# Patient Record
Sex: MALE | Race: WHITE | Employment: FULL TIME | URBAN - NONMETROPOLITAN AREA
[De-identification: names, ages, dates, MRNs, and addresses within clinical notes are randomized per-mention and may not be internally consistent; named-entity substitution may affect disease eponyms.]

---

## 2019-11-12 ENCOUNTER — HOSPITAL ENCOUNTER (OUTPATIENT)
Age: 29
Setting detail: OBSERVATION
Discharge: HOME | End: 2019-11-13
Attending: FAMILY MEDICINE | Admitting: INTERNAL MEDICINE
Payer: COMMERCIAL

## 2019-11-12 ENCOUNTER — APPOINTMENT (OUTPATIENT)
Dept: MRI IMAGING | Age: 29
End: 2019-11-12
Payer: COMMERCIAL

## 2019-11-12 DIAGNOSIS — M54.9 INTRACTABLE BACK PAIN: Primary | ICD-10-CM

## 2019-11-12 DIAGNOSIS — M54.41 ACUTE RIGHT-SIDED LOW BACK PAIN WITH RIGHT-SIDED SCIATICA: ICD-10-CM

## 2019-11-12 PROCEDURE — 96374 THER/PROPH/DIAG INJ IV PUSH: CPT

## 2019-11-12 PROCEDURE — 2580000003 HC RX 258: Performed by: INTERNAL MEDICINE

## 2019-11-12 PROCEDURE — 96372 THER/PROPH/DIAG INJ SC/IM: CPT

## 2019-11-12 PROCEDURE — 94761 N-INVAS EAR/PLS OXIMETRY MLT: CPT

## 2019-11-12 PROCEDURE — 96375 TX/PRO/DX INJ NEW DRUG ADDON: CPT

## 2019-11-12 PROCEDURE — 96376 TX/PRO/DX INJ SAME DRUG ADON: CPT

## 2019-11-12 PROCEDURE — G0378 HOSPITAL OBSERVATION PER HR: HCPCS

## 2019-11-12 PROCEDURE — 6360000002 HC RX W HCPCS: Performed by: FAMILY MEDICINE

## 2019-11-12 PROCEDURE — 6360000002 HC RX W HCPCS: Performed by: INTERNAL MEDICINE

## 2019-11-12 PROCEDURE — 6370000000 HC RX 637 (ALT 250 FOR IP): Performed by: INTERNAL MEDICINE

## 2019-11-12 PROCEDURE — 72148 MRI LUMBAR SPINE W/O DYE: CPT

## 2019-11-12 PROCEDURE — 99284 EMERGENCY DEPT VISIT MOD MDM: CPT

## 2019-11-12 RX ORDER — ONDANSETRON 2 MG/ML
4 INJECTION INTRAMUSCULAR; INTRAVENOUS ONCE
Status: COMPLETED | OUTPATIENT
Start: 2019-11-12 | End: 2019-11-12

## 2019-11-12 RX ORDER — ONDANSETRON 2 MG/ML
4 INJECTION INTRAMUSCULAR; INTRAVENOUS EVERY 6 HOURS PRN
Status: DISCONTINUED | OUTPATIENT
Start: 2019-11-12 | End: 2019-11-13 | Stop reason: HOSPADM

## 2019-11-12 RX ORDER — GABAPENTIN 300 MG/1
300 CAPSULE ORAL 3 TIMES DAILY
Status: DISCONTINUED | OUTPATIENT
Start: 2019-11-12 | End: 2019-11-13 | Stop reason: HOSPADM

## 2019-11-12 RX ORDER — LIDOCAINE 4 G/G
1 PATCH TOPICAL DAILY
Status: DISCONTINUED | OUTPATIENT
Start: 2019-11-12 | End: 2019-11-13 | Stop reason: HOSPADM

## 2019-11-12 RX ORDER — IBUPROFEN 800 MG/1
800 TABLET ORAL EVERY 8 HOURS PRN
Qty: 30 TABLET | Refills: 1 | Status: SHIPPED | OUTPATIENT
Start: 2019-11-12 | End: 2019-11-13 | Stop reason: HOSPADM

## 2019-11-12 RX ORDER — METHYLPREDNISOLONE SODIUM SUCCINATE 125 MG/2ML
80 INJECTION, POWDER, LYOPHILIZED, FOR SOLUTION INTRAMUSCULAR; INTRAVENOUS EVERY 8 HOURS
Status: DISCONTINUED | OUTPATIENT
Start: 2019-11-12 | End: 2019-11-13 | Stop reason: HOSPADM

## 2019-11-12 RX ORDER — CYCLOBENZAPRINE HCL 10 MG
10 TABLET ORAL NIGHTLY PRN
Qty: 15 TABLET | Refills: 0 | Status: SHIPPED | OUTPATIENT
Start: 2019-11-12 | End: 2019-11-13 | Stop reason: HOSPADM

## 2019-11-12 RX ORDER — DEXAMETHASONE SODIUM PHOSPHATE 10 MG/ML
10 INJECTION INTRAMUSCULAR; INTRAVENOUS ONCE
Status: COMPLETED | OUTPATIENT
Start: 2019-11-12 | End: 2019-11-12

## 2019-11-12 RX ORDER — HYDROCODONE BITARTRATE AND ACETAMINOPHEN 5; 325 MG/1; MG/1
1 TABLET ORAL EVERY 6 HOURS PRN
Qty: 10 TABLET | Refills: 0 | Status: SHIPPED | OUTPATIENT
Start: 2019-11-12 | End: 2019-11-13 | Stop reason: HOSPADM

## 2019-11-12 RX ORDER — SODIUM CHLORIDE 0.9 % (FLUSH) 0.9 %
10 SYRINGE (ML) INJECTION PRN
Status: DISCONTINUED | OUTPATIENT
Start: 2019-11-12 | End: 2019-11-13 | Stop reason: HOSPADM

## 2019-11-12 RX ORDER — PREDNISONE 20 MG/1
60 TABLET ORAL DAILY
Qty: 12 TABLET | Refills: 0 | Status: SHIPPED | OUTPATIENT
Start: 2019-11-12 | End: 2019-11-13 | Stop reason: HOSPADM

## 2019-11-12 RX ORDER — KETOROLAC TROMETHAMINE 30 MG/ML
30 INJECTION, SOLUTION INTRAMUSCULAR; INTRAVENOUS ONCE
Status: COMPLETED | OUTPATIENT
Start: 2019-11-12 | End: 2019-11-12

## 2019-11-12 RX ORDER — ORPHENADRINE CITRATE 30 MG/ML
30 INJECTION INTRAMUSCULAR; INTRAVENOUS ONCE
Status: COMPLETED | OUTPATIENT
Start: 2019-11-12 | End: 2019-11-12

## 2019-11-12 RX ORDER — SODIUM CHLORIDE 0.9 % (FLUSH) 0.9 %
10 SYRINGE (ML) INJECTION EVERY 12 HOURS SCHEDULED
Status: DISCONTINUED | OUTPATIENT
Start: 2019-11-12 | End: 2019-11-13 | Stop reason: HOSPADM

## 2019-11-12 RX ORDER — MORPHINE SULFATE 4 MG/ML
4 INJECTION, SOLUTION INTRAMUSCULAR; INTRAVENOUS ONCE
Status: COMPLETED | OUTPATIENT
Start: 2019-11-12 | End: 2019-11-12

## 2019-11-12 RX ORDER — ACETAMINOPHEN 325 MG/1
650 TABLET ORAL EVERY 4 HOURS PRN
Status: DISCONTINUED | OUTPATIENT
Start: 2019-11-12 | End: 2019-11-13 | Stop reason: HOSPADM

## 2019-11-12 RX ADMIN — METHYLPREDNISOLONE SODIUM SUCCINATE 80 MG: 125 INJECTION, POWDER, FOR SOLUTION INTRAMUSCULAR; INTRAVENOUS at 16:35

## 2019-11-12 RX ADMIN — GABAPENTIN 300 MG: 300 CAPSULE ORAL at 20:45

## 2019-11-12 RX ADMIN — Medication 10 ML: at 16:36

## 2019-11-12 RX ADMIN — MORPHINE SULFATE 4 MG: 4 INJECTION INTRAVENOUS at 09:37

## 2019-11-12 RX ADMIN — HYDROMORPHONE HYDROCHLORIDE 1 MG: 1 INJECTION, SOLUTION INTRAMUSCULAR; INTRAVENOUS; SUBCUTANEOUS at 14:17

## 2019-11-12 RX ADMIN — DEXAMETHASONE SODIUM PHOSPHATE 10 MG: 10 INJECTION, SOLUTION INTRAMUSCULAR; INTRAVENOUS at 09:10

## 2019-11-12 RX ADMIN — Medication 10 ML: at 20:45

## 2019-11-12 RX ADMIN — ORPHENADRINE CITRATE 30 MG: 30 INJECTION INTRAMUSCULAR; INTRAVENOUS at 09:07

## 2019-11-12 RX ADMIN — ENOXAPARIN SODIUM 40 MG: 40 INJECTION SUBCUTANEOUS at 16:36

## 2019-11-12 RX ADMIN — GABAPENTIN 300 MG: 300 CAPSULE ORAL at 16:35

## 2019-11-12 RX ADMIN — HYDROMORPHONE HYDROCHLORIDE 1 MG: 1 INJECTION, SOLUTION INTRAMUSCULAR; INTRAVENOUS; SUBCUTANEOUS at 10:21

## 2019-11-12 RX ADMIN — ONDANSETRON 4 MG: 2 INJECTION, SOLUTION INTRAMUSCULAR; INTRAVENOUS at 09:37

## 2019-11-12 RX ADMIN — METHYLPREDNISOLONE SODIUM SUCCINATE 80 MG: 125 INJECTION, POWDER, FOR SOLUTION INTRAMUSCULAR; INTRAVENOUS at 22:22

## 2019-11-12 RX ADMIN — KETOROLAC TROMETHAMINE 30 MG: 30 INJECTION, SOLUTION INTRAMUSCULAR at 09:13

## 2019-11-12 ASSESSMENT — PAIN DESCRIPTION - PAIN TYPE
TYPE: ACUTE PAIN

## 2019-11-12 ASSESSMENT — PAIN DESCRIPTION - ONSET
ONSET: SUDDEN
ONSET: SUDDEN

## 2019-11-12 ASSESSMENT — PAIN SCALES - GENERAL
PAINLEVEL_OUTOF10: 8
PAINLEVEL_OUTOF10: 8
PAINLEVEL_OUTOF10: 7
PAINLEVEL_OUTOF10: 10
PAINLEVEL_OUTOF10: 10
PAINLEVEL_OUTOF10: 1
PAINLEVEL_OUTOF10: 10
PAINLEVEL_OUTOF10: 2

## 2019-11-12 ASSESSMENT — PAIN DESCRIPTION - ORIENTATION
ORIENTATION: RIGHT
ORIENTATION: LOWER
ORIENTATION: LEFT;LOWER;MID
ORIENTATION: RIGHT;LOWER

## 2019-11-12 ASSESSMENT — PAIN DESCRIPTION - LOCATION
LOCATION: BACK

## 2019-11-12 ASSESSMENT — PAIN DESCRIPTION - PROGRESSION: CLINICAL_PROGRESSION: GRADUALLY IMPROVING

## 2019-11-12 ASSESSMENT — PAIN DESCRIPTION - FREQUENCY
FREQUENCY: CONTINUOUS
FREQUENCY: CONTINUOUS

## 2019-11-12 ASSESSMENT — PAIN DESCRIPTION - DESCRIPTORS
DESCRIPTORS: SHARP
DESCRIPTORS: BURNING;RADIATING
DESCRIPTORS: SHARP

## 2019-11-12 ASSESSMENT — ENCOUNTER SYMPTOMS: BACK PAIN: 1

## 2019-11-13 VITALS
RESPIRATION RATE: 18 BRPM | TEMPERATURE: 97.7 F | WEIGHT: 309.3 LBS | HEART RATE: 89 BPM | OXYGEN SATURATION: 92 % | HEIGHT: 71 IN | SYSTOLIC BLOOD PRESSURE: 104 MMHG | BODY MASS INDEX: 43.3 KG/M2 | DIASTOLIC BLOOD PRESSURE: 43 MMHG

## 2019-11-13 PROCEDURE — 6370000000 HC RX 637 (ALT 250 FOR IP): Performed by: INTERNAL MEDICINE

## 2019-11-13 PROCEDURE — 6360000002 HC RX W HCPCS: Performed by: INTERNAL MEDICINE

## 2019-11-13 PROCEDURE — G0378 HOSPITAL OBSERVATION PER HR: HCPCS

## 2019-11-13 PROCEDURE — 2580000003 HC RX 258: Performed by: INTERNAL MEDICINE

## 2019-11-13 PROCEDURE — 94761 N-INVAS EAR/PLS OXIMETRY MLT: CPT

## 2019-11-13 PROCEDURE — 97161 PT EVAL LOW COMPLEX 20 MIN: CPT

## 2019-11-13 PROCEDURE — 96376 TX/PRO/DX INJ SAME DRUG ADON: CPT

## 2019-11-13 PROCEDURE — 96372 THER/PROPH/DIAG INJ SC/IM: CPT

## 2019-11-13 RX ORDER — TRAMADOL HYDROCHLORIDE 50 MG/1
50 TABLET ORAL EVERY 4 HOURS PRN
Status: DISCONTINUED | OUTPATIENT
Start: 2019-11-13 | End: 2019-11-13 | Stop reason: HOSPADM

## 2019-11-13 RX ORDER — PREDNISONE 20 MG/1
TABLET ORAL
Qty: 15 TABLET | Refills: 0 | Status: SHIPPED | OUTPATIENT
Start: 2019-11-13 | End: 2019-11-23

## 2019-11-13 RX ORDER — TRAMADOL HYDROCHLORIDE 50 MG/1
50 TABLET ORAL EVERY 4 HOURS PRN
Qty: 40 TABLET | Refills: 0 | Status: SHIPPED | OUTPATIENT
Start: 2019-11-13 | End: 2019-11-20

## 2019-11-13 RX ORDER — GABAPENTIN 300 MG/1
300 CAPSULE ORAL 3 TIMES DAILY
Qty: 30 CAPSULE | Refills: 0 | Status: SHIPPED | OUTPATIENT
Start: 2019-11-13 | End: 2021-05-20 | Stop reason: ALTCHOICE

## 2019-11-13 RX ORDER — LIDOCAINE 4 G/G
1 PATCH TOPICAL DAILY
Qty: 10 PATCH | Refills: 1 | Status: SHIPPED | OUTPATIENT
Start: 2019-11-13 | End: 2021-05-20

## 2019-11-13 RX ADMIN — GABAPENTIN 300 MG: 300 CAPSULE ORAL at 08:10

## 2019-11-13 RX ADMIN — METHYLPREDNISOLONE SODIUM SUCCINATE 80 MG: 125 INJECTION, POWDER, FOR SOLUTION INTRAMUSCULAR; INTRAVENOUS at 06:38

## 2019-11-13 RX ADMIN — ENOXAPARIN SODIUM 40 MG: 40 INJECTION SUBCUTANEOUS at 08:10

## 2019-11-13 RX ADMIN — TRAMADOL HYDROCHLORIDE 50 MG: 50 TABLET, FILM COATED ORAL at 08:10

## 2019-11-13 RX ADMIN — Medication 10 ML: at 08:10

## 2019-11-13 ASSESSMENT — PAIN DESCRIPTION - PAIN TYPE
TYPE: ACUTE PAIN
TYPE: ACUTE PAIN

## 2019-11-13 ASSESSMENT — PAIN SCALES - GENERAL
PAINLEVEL_OUTOF10: 1
PAINLEVEL_OUTOF10: 1
PAINLEVEL_OUTOF10: 9
PAINLEVEL_OUTOF10: 1
PAINLEVEL_OUTOF10: 0

## 2019-11-13 ASSESSMENT — PAIN DESCRIPTION - LOCATION
LOCATION: BACK
LOCATION: BACK

## 2019-11-13 ASSESSMENT — PAIN DESCRIPTION - DESCRIPTORS
DESCRIPTORS: SHARP
DESCRIPTORS: DISCOMFORT

## 2019-11-13 ASSESSMENT — PAIN DESCRIPTION - ORIENTATION
ORIENTATION: LOWER
ORIENTATION: LOWER

## 2019-11-18 ENCOUNTER — OFFICE VISIT (OUTPATIENT)
Dept: FAMILY MEDICINE CLINIC | Age: 29
End: 2019-11-18
Payer: COMMERCIAL

## 2019-11-18 VITALS
TEMPERATURE: 97.5 F | DIASTOLIC BLOOD PRESSURE: 64 MMHG | BODY MASS INDEX: 41.72 KG/M2 | SYSTOLIC BLOOD PRESSURE: 122 MMHG | WEIGHT: 308 LBS | OXYGEN SATURATION: 96 % | HEART RATE: 98 BPM | HEIGHT: 72 IN

## 2019-11-18 DIAGNOSIS — Z09 FOLLOW-UP EXAM: ICD-10-CM

## 2019-11-18 DIAGNOSIS — M54.9 INTRACTABLE BACK PAIN: Primary | ICD-10-CM

## 2019-11-18 PROCEDURE — 99203 OFFICE O/P NEW LOW 30 MIN: CPT | Performed by: NURSE PRACTITIONER

## 2019-11-18 RX ORDER — TIZANIDINE 4 MG/1
4 TABLET ORAL NIGHTLY PRN
Qty: 30 TABLET | Refills: 0 | Status: SHIPPED | OUTPATIENT
Start: 2019-11-18 | End: 2021-05-20

## 2019-11-18 ASSESSMENT — PATIENT HEALTH QUESTIONNAIRE - PHQ9
SUM OF ALL RESPONSES TO PHQ9 QUESTIONS 1 & 2: 0
1. LITTLE INTEREST OR PLEASURE IN DOING THINGS: 0
SUM OF ALL RESPONSES TO PHQ QUESTIONS 1-9: 0
SUM OF ALL RESPONSES TO PHQ QUESTIONS 1-9: 0
2. FEELING DOWN, DEPRESSED OR HOPELESS: 0

## 2019-11-18 ASSESSMENT — ENCOUNTER SYMPTOMS
COUGH: 0
DIARRHEA: 0
BOWEL INCONTINENCE: 0
CONSTIPATION: 0
BLOOD IN STOOL: 0
VOMITING: 0
NAUSEA: 0
ABDOMINAL PAIN: 0
BACK PAIN: 1
SHORTNESS OF BREATH: 0
SORE THROAT: 0

## 2019-11-19 ENCOUNTER — HOSPITAL ENCOUNTER (OUTPATIENT)
Dept: PHYSICAL THERAPY | Age: 29
Setting detail: THERAPIES SERIES
Discharge: HOME | End: 2019-11-19
Payer: COMMERCIAL

## 2019-11-19 PROCEDURE — 97110 THERAPEUTIC EXERCISES: CPT

## 2019-11-19 PROCEDURE — 97161 PT EVAL LOW COMPLEX 20 MIN: CPT

## 2019-11-19 ASSESSMENT — PAIN SCALES - GENERAL: PAINLEVEL_OUTOF10: 0

## 2019-11-21 ENCOUNTER — HOSPITAL ENCOUNTER (OUTPATIENT)
Dept: PHYSICAL THERAPY | Age: 29
Setting detail: THERAPIES SERIES
Discharge: HOME | End: 2019-11-21
Payer: COMMERCIAL

## 2019-11-21 PROCEDURE — 97140 MANUAL THERAPY 1/> REGIONS: CPT

## 2019-11-21 PROCEDURE — 97110 THERAPEUTIC EXERCISES: CPT

## 2021-05-20 ENCOUNTER — OFFICE VISIT (OUTPATIENT)
Dept: FAMILY MEDICINE CLINIC | Age: 31
End: 2021-05-20
Payer: COMMERCIAL

## 2021-05-20 VITALS
TEMPERATURE: 98.4 F | OXYGEN SATURATION: 97 % | HEART RATE: 94 BPM | BODY MASS INDEX: 44.1 KG/M2 | HEIGHT: 71 IN | WEIGHT: 315 LBS | DIASTOLIC BLOOD PRESSURE: 74 MMHG | SYSTOLIC BLOOD PRESSURE: 128 MMHG

## 2021-05-20 DIAGNOSIS — G47.33 OSA (OBSTRUCTIVE SLEEP APNEA): Primary | ICD-10-CM

## 2021-05-20 PROCEDURE — 99213 OFFICE O/P EST LOW 20 MIN: CPT | Performed by: NURSE PRACTITIONER

## 2021-05-20 SDOH — ECONOMIC STABILITY: FOOD INSECURITY: WITHIN THE PAST 12 MONTHS, THE FOOD YOU BOUGHT JUST DIDN'T LAST AND YOU DIDN'T HAVE MONEY TO GET MORE.: NEVER TRUE

## 2021-05-20 ASSESSMENT — ENCOUNTER SYMPTOMS
SHORTNESS OF BREATH: 0
VOMITING: 0
NAUSEA: 0
COUGH: 0
DIARRHEA: 0

## 2021-05-20 ASSESSMENT — PATIENT HEALTH QUESTIONNAIRE - PHQ9: SUM OF ALL RESPONSES TO PHQ9 QUESTIONS 1 & 2: 0

## 2021-05-20 NOTE — PROGRESS NOTES
HPI Notes    Name: Armond Medrano  : 1990         Chief Complaint:     Chief Complaint   Patient presents with    Sleep Apnea     Patient here today with loud snoring, sleep apnea. He is concerned because he just got engaged and wants to take care of this. History of Present Illness:        HPI  Pt is a 36 yo male who presents for concerns about sleep apnea. Pt snores loudly and has been witnessed to gasp for air while sleeping. First noticed several months ago. Past Medical History:     Past Medical History:   Diagnosis Date    Back pain       Reviewed all health maintenance requirements and ordered appropriate tests  Health Maintenance Due   Topic Date Due    Hepatitis C screen  Never done    Varicella vaccine (1 of 2 - 2-dose childhood series) Never done    COVID-19 Vaccine (1) Never done    HIV screen  Never done    DTaP/Tdap/Td vaccine (1 - Tdap) Never done       Past Surgical History:     Past Surgical History:   Procedure Laterality Date    CLEFT PALATE REPAIR          Medications:       Prior to Admission medications    Not on File        Allergies:       Patient has no known allergies. Social History:     Tobacco:    reports that he has never smoked. He has never used smokeless tobacco.  Alcohol:      reports previous alcohol use. Drug Use:  reports no history of drug use. Family History:      No family history on file. Review of Systems:         Review of Systems   Constitutional: Negative for chills and fever. Respiratory: Negative for cough and shortness of breath. Cardiovascular: Negative for chest pain and palpitations. Gastrointestinal: Negative for diarrhea, nausea and vomiting. Neurological: Negative for dizziness, seizures and headaches.          Physical Exam:     Vitals:  /74   Pulse 94   Temp 98.4 °F (36.9 °C) (Oral)   Ht 5' 11\" (1.803 m)   Wt (!) 346 lb (156.9 kg)   SpO2 97%   BMI 48.26 kg/m²       Physical Exam  Vitals and nursing note reviewed. Constitutional:       Appearance: He is well-developed. Cardiovascular:      Rate and Rhythm: Normal rate and regular rhythm. Heart sounds: S1 normal and S2 normal.   Pulmonary:      Effort: Pulmonary effort is normal. No respiratory distress. Breath sounds: Normal breath sounds. Abdominal:      General: Bowel sounds are normal.      Palpations: Abdomen is soft. Tenderness: There is no abdominal tenderness. Skin:     General: Skin is warm and dry. Psychiatric:         Behavior: Behavior is cooperative. Data:     No results found for: NA, K, CL, CO2, BUN, CREATININE, GLUCOSE, PROT, LABALBU, BILITOT, ALKPHOS, AST, ALT  No results found for: WBC, RBC, HGB, HCT, MCV, MCH, MCHC, RDW, PLT, MPV  No results found for: TSH  No results found for: CHOL, HDL, PSA, LABA1C       Assessment & Plan        Diagnosis Orders   1. LIBBY (obstructive sleep apnea)       Pt wants to do an in-home sleep study if possible. Pt will call his insurance and find out if in-lab vs home sleep study is covered. Patient verbalizes understanding and agreement with plan. All questions answered. If symptoms do not resolve or worsen, return to office. Completed Refills   Requested Prescriptions      No prescriptions requested or ordered in this encounter     No follow-ups on file. No orders of the defined types were placed in this encounter. No orders of the defined types were placed in this encounter. Patient Instructions   SURVEY:    You may be receiving a survey from [employer] regarding your visit today. Please complete the survey to enable us to provide the highest quality of care to you and your family. If you cannot score us a very good (5 Stars) on any question, please call the office to discuss how we could have made your experience a very good one. Thank you.     Clinical Care Team: MELODY Lin-YASSINE Jaffe LPN Clerical Team: [facility] 11        Electronically signed by MELODY Schaeffer CNP on 5/20/2021 at 5:30 PM           Completed Refills      Requested Prescriptions      No prescriptions requested or ordered in this encounter         Patricio Pearce received counseling on the following healthy behaviors: medication adherence  Reviewed prior labs and health maintenance. Continue current medications, diet and exercise. Discussed use, benefit, and side effects of prescribed medications. Barriers to medication compliance addressed. Patient given educational materials - see patient instructions. All patient questions answered. Patient voiced understanding.

## 2021-06-07 ENCOUNTER — TELEPHONE (OUTPATIENT)
Dept: FAMILY MEDICINE CLINIC | Age: 31
End: 2021-06-07

## 2021-06-07 DIAGNOSIS — G47.33 OSA (OBSTRUCTIVE SLEEP APNEA): Primary | ICD-10-CM

## 2021-06-07 NOTE — TELEPHONE ENCOUNTER
Patient's insurance would not give him a direct answer - go ahead with in lab referral and if they refuse that we will try home study    Health Maintenance   Topic Date Due    Hepatitis C screen  Never done    Varicella vaccine (1 of 2 - 2-dose childhood series) Never done    COVID-19 Vaccine (1) Never done    HIV screen  Never done    DTaP/Tdap/Td vaccine (1 - Tdap) Never done    Flu vaccine (Season Ended) 09/01/2021    Hepatitis A vaccine  Aged Out    Hepatitis B vaccine  Aged Out    Hib vaccine  Aged Out    Meningococcal (ACWY) vaccine  Aged Out    Pneumococcal 0-64 years Vaccine  Aged Out             (applicable per patient's age: Cancer Screenings, Depression Screening, Fall Risk Screening, Immunizations)    No results found for: LABA1C, LABMICR, LDLCHOLESTEROL, LDLCALC, AST, ALT, BUN   (goal A1C is < 7)   (goal LDL is <100) need 30-50% reduction from baseline     BP Readings from Last 3 Encounters:   05/20/21 128/74   11/18/19 122/64   11/13/19 (!) 104/43    (goal /80)      All Future Testing planned in [facility]:      Next Visit Date:  No future appointments.          Patient Active Problem List:     Intractable back pain

## 2021-06-25 ENCOUNTER — OFFICE VISIT (OUTPATIENT)
Dept: FAMILY MEDICINE CLINIC | Age: 31
End: 2021-06-25
Payer: COMMERCIAL

## 2021-06-25 VITALS
WEIGHT: 315 LBS | BODY MASS INDEX: 48.82 KG/M2 | OXYGEN SATURATION: 96 % | TEMPERATURE: 98.2 F | DIASTOLIC BLOOD PRESSURE: 78 MMHG | SYSTOLIC BLOOD PRESSURE: 124 MMHG | HEART RATE: 90 BPM

## 2021-06-25 DIAGNOSIS — D22.4 MULTIPLE BENIGN NEVI OF SCALP: Primary | ICD-10-CM

## 2021-06-25 PROCEDURE — 99213 OFFICE O/P EST LOW 20 MIN: CPT | Performed by: NURSE PRACTITIONER

## 2021-06-25 ASSESSMENT — ENCOUNTER SYMPTOMS
VOMITING: 0
SHORTNESS OF BREATH: 0
DIARRHEA: 0
COUGH: 0
NAUSEA: 0

## 2021-06-25 NOTE — PROGRESS NOTES
HPI Notes    Name: Ian Liang  : 1990         Chief Complaint:     Chief Complaint   Patient presents with    Lesion(s)     Patient here today to have areas checked on scalp. History of Present Illness:        HPI  Pt is a 36 yo male who present for 4 lesions to scalp. First noticed a couple years ago, but new lesion have formed over the past couple months. Lesions are not painful. Denies any drainage from them. Denies fever or chills. Past Medical History:     Past Medical History:   Diagnosis Date    Back pain       Reviewed all health maintenance requirements and ordered appropriate tests  Health Maintenance Due   Topic Date Due    Hepatitis C screen  Never done    Varicella vaccine (1 of 2 - 2-dose childhood series) Never done    COVID-19 Vaccine (1) Never done    HIV screen  Never done    DTaP/Tdap/Td vaccine (1 - Tdap) Never done       Past Surgical History:     Past Surgical History:   Procedure Laterality Date    CLEFT PALATE REPAIR          Medications:       Prior to Admission medications    Not on File        Allergies:       Patient has no known allergies. Social History:     Tobacco:    reports that he has never smoked. He has never used smokeless tobacco.  Alcohol:      reports previous alcohol use. Drug Use:  reports no history of drug use. Family History:      No family history on file. Review of Systems:         Review of Systems   Constitutional: Negative for chills and fever. Respiratory: Negative for cough and shortness of breath. Cardiovascular: Negative for chest pain and palpitations. Gastrointestinal: Negative for diarrhea, nausea and vomiting. Skin: Positive for rash (lesions to scalp). Neurological: Negative for dizziness, seizures and headaches.          Physical Exam:     Vitals:  /78   Pulse 90   Temp 98.2 °F (36.8 °C) (Oral)   Wt (!) 350 lb (158.8 kg)   SpO2 96%   BMI 48.82 kg/m²       Physical Exam  Vitals and nursing note reviewed. Constitutional:       Appearance: He is well-developed. HENT:      Head: Normocephalic. Comments: 4 small rounded lesion with clearly defined borders to scalp. Cardiovascular:      Rate and Rhythm: Normal rate and regular rhythm. Heart sounds: S1 normal and S2 normal.   Pulmonary:      Effort: Pulmonary effort is normal. No respiratory distress. Breath sounds: Normal breath sounds. Abdominal:      General: Bowel sounds are normal.      Palpations: Abdomen is soft. Tenderness: There is no abdominal tenderness. Skin:     General: Skin is warm and dry. Psychiatric:         Behavior: Behavior is cooperative. Data:     No results found for: NA, K, CL, CO2, BUN, CREATININE, GLUCOSE, PROT, LABALBU, BILITOT, ALKPHOS, AST, ALT  No results found for: WBC, RBC, HGB, HCT, MCV, MCH, MCHC, RDW, PLT, MPV  No results found for: TSH  No results found for: CHOL, HDL, PSA, LABA1C       Assessment & Plan        Diagnosis Orders   1. Multiple benign nevi of scalp       Pt given reassurance about benign nature of findings. Pt encouraged to stop picking at areas. Patient verbalizes understanding and agreement with plan. All questions answered. If symptoms do not resolve or worsen, return to office. Completed Refills   Requested Prescriptions      No prescriptions requested or ordered in this encounter     No follow-ups on file. No orders of the defined types were placed in this encounter. No orders of the defined types were placed in this encounter. Patient Instructions   SURVEY:    You may be receiving a survey from [employer] regarding your visit today. Please complete the survey to enable us to provide the highest quality of care to you and your family. If you cannot score us a very good (5 Stars) on any question, please call the office to discuss how we could have made your experience a very good one. Thank you.     Clinical Care Team: VALORIE Ewing LPN    Clerical Team: Aisha 11        Electronically signed by MELODY Ewing CNP on 6/25/2021 at 3:31 PM           Completed Refills      Requested Prescriptions      No prescriptions requested or ordered in this encounter         Juani Law received counseling on the following healthy behaviors: medication adherence  Reviewed prior labs and health maintenance. Continue current medications, diet and exercise. Discussed use, benefit, and side effects of prescribed medications. Barriers to medication compliance addressed. Patient given educational materials - see patient instructions. All patient questions answered. Patient voiced understanding.

## 2021-06-29 ENCOUNTER — HOSPITAL ENCOUNTER (OUTPATIENT)
Dept: SLEEP CENTER | Age: 31
Discharge: HOME | End: 2021-06-29
Payer: COMMERCIAL

## 2021-06-29 DIAGNOSIS — G47.33 OSA (OBSTRUCTIVE SLEEP APNEA): ICD-10-CM

## 2021-06-29 PROCEDURE — 95806 SLEEP STUDY UNATT&RESP EFFT: CPT

## 2021-06-29 ASSESSMENT — SLEEP AND FATIGUE QUESTIONNAIRES
HOW LIKELY ARE YOU TO NOD OFF OR FALL ASLEEP WHILE SITTING AND READING: 2
NECK CIRCUMFERENCE (INCHES): 20
HOW LIKELY ARE YOU TO NOD OFF OR FALL ASLEEP WHILE SITTING AND TALKING TO SOMEONE: 0
HOW LIKELY ARE YOU TO NOD OFF OR FALL ASLEEP WHEN YOU ARE A PASSENGER IN A CAR FOR AN HOUR WITHOUT A BREAK: 0
HOW LIKELY ARE YOU TO NOD OFF OR FALL ASLEEP WHILE LYING DOWN TO REST IN THE AFTERNOON WHEN CIRCUMSTANCES PERMIT: 1
HOW LIKELY ARE YOU TO NOD OFF OR FALL ASLEEP WHILE SITTING INACTIVE IN A PUBLIC PLACE: 1
HOW LIKELY ARE YOU TO NOD OFF OR FALL ASLEEP WHILE SITTING QUIETLY AFTER LUNCH WITHOUT ALCOHOL: 3
HOW LIKELY ARE YOU TO NOD OFF OR FALL ASLEEP WHILE WATCHING TV: 0
ESS TOTAL SCORE: 9
HOW LIKELY ARE YOU TO NOD OFF OR FALL ASLEEP IN A CAR, WHILE STOPPED FOR A FEW MINUTES IN TRAFFIC: 2

## 2021-07-02 ENCOUNTER — TELEPHONE (OUTPATIENT)
Dept: FAMILY MEDICINE CLINIC | Age: 31
End: 2021-07-02

## 2021-07-02 DIAGNOSIS — G47.33 OSA (OBSTRUCTIVE SLEEP APNEA): Primary | ICD-10-CM

## 2021-07-02 NOTE — TELEPHONE ENCOUNTER
----- Message from MELODY Blunt CNP sent at 7/2/2021  3:57 PM EDT -----  Rusty Perdomo,    Please order a titration study for this pt. Sridhar  ----- Message -----  From: Cynthia Ribeiro DO  Sent: 7/2/2021   1:27 PM EDT  To: MELODY Blunt CNP    Accidentally sent to me.   Dr. Yary Dickey  ----- Message -----  From: Aníbal Chaves Sleep Study Results  Sent: 7/1/2021   2:37 PM EDT  To: Cynthia Ribeiro DO

## 2021-08-02 LAB — STATUS: NORMAL

## 2021-08-26 ENCOUNTER — OFFICE VISIT (OUTPATIENT)
Dept: FAMILY MEDICINE CLINIC | Age: 31
End: 2021-08-26
Payer: COMMERCIAL

## 2021-08-26 VITALS
OXYGEN SATURATION: 97 % | DIASTOLIC BLOOD PRESSURE: 80 MMHG | HEART RATE: 76 BPM | SYSTOLIC BLOOD PRESSURE: 112 MMHG | TEMPERATURE: 98.1 F | WEIGHT: 315 LBS | BODY MASS INDEX: 49.51 KG/M2

## 2021-08-26 DIAGNOSIS — G47.33 OSA (OBSTRUCTIVE SLEEP APNEA): Primary | ICD-10-CM

## 2021-08-26 PROCEDURE — 99213 OFFICE O/P EST LOW 20 MIN: CPT | Performed by: NURSE PRACTITIONER

## 2021-08-26 ASSESSMENT — ENCOUNTER SYMPTOMS
VOMITING: 0
SHORTNESS OF BREATH: 0
NAUSEA: 0
COUGH: 0
DIARRHEA: 0

## 2021-08-26 NOTE — PATIENT INSTRUCTIONS
SURVEY:    You may be receiving a survey from [employer] regarding your visit today. Please complete the survey to enable us to provide the highest quality of care to you and your family. If you cannot score us a very good (5 Stars) on any question, please call the office to discuss how we could have made your experience a very good one. Thank you.     Clinical Care Team: MELODY Neri-YASSINE Valdez LPN    Clerical Team: Ana Wick

## 2021-08-26 NOTE — PROGRESS NOTES
HPI Notes    Name: Author Reason  : 1990         Chief Complaint:     Chief Complaint   Patient presents with    Sleep Apnea     Patient here today to discuss how he feels after starting on cpap. Started 2 weeks ago. History of Present Illness:        HPI  Pt is a 36 yo male who presents for follow up after starting a CPAP machine. Pt has been using the CPAP nightly and benefiting from it. Pt has a nasal mask and sometimes his mouth falls open while sleeping. Pt would like to get a full face mask. Past Medical History:     Past Medical History:   Diagnosis Date    Back pain       Reviewed all health maintenance requirements and ordered appropriate tests  Health Maintenance Due   Topic Date Due    Hepatitis C screen  Never done    Varicella vaccine (1 of 2 - 2-dose childhood series) Never done    COVID-19 Vaccine (1) Never done    HIV screen  Never done    DTaP/Tdap/Td vaccine (1 - Tdap) Never done       Past Surgical History:     Past Surgical History:   Procedure Laterality Date    CLEFT PALATE REPAIR          Medications:       Prior to Admission medications    Medication Sig Start Date End Date Taking? Authorizing Provider   CPAP Machine MISC by Does not apply route   Yes Historical Provider, MD        Allergies:       Patient has no known allergies. Social History:     Tobacco:    reports that he has never smoked. He has never used smokeless tobacco.  Alcohol:      reports previous alcohol use. Drug Use:  reports no history of drug use. Family History:      No family history on file. Review of Systems:         Review of Systems   Constitutional: Negative for chills and fever. Respiratory: Negative for cough and shortness of breath. Cardiovascular: Negative for chest pain and palpitations. Gastrointestinal: Negative for diarrhea, nausea and vomiting. Neurological: Negative for dizziness, seizures and headaches.          Physical Exam:     Vitals:  /80   Pulse 76 Temp 98.1 °F (36.7 °C) (Oral)   Wt (!) 355 lb (161 kg)   SpO2 97%   BMI 49.51 kg/m²       Physical Exam  Vitals and nursing note reviewed. Constitutional:       Appearance: He is well-developed. Cardiovascular:      Rate and Rhythm: Normal rate and regular rhythm. Heart sounds: S1 normal and S2 normal.   Pulmonary:      Effort: Pulmonary effort is normal. No respiratory distress. Breath sounds: Normal breath sounds. Abdominal:      General: Bowel sounds are normal.      Palpations: Abdomen is soft. Tenderness: There is no abdominal tenderness. Skin:     General: Skin is warm and dry. Psychiatric:         Behavior: Behavior is cooperative. Data:     No results found for: NA, K, CL, CO2, BUN, CREATININE, GLUCOSE, PROT, LABALBU, BILITOT, ALKPHOS, AST, ALT  No results found for: WBC, RBC, HGB, HCT, MCV, MCH, MCHC, RDW, PLT, MPV  No results found for: TSH  No results found for: CHOL, HDL, PSA, LABA1C       Assessment & Plan        Diagnosis Orders   1. LIBBY (obstructive sleep apnea)       Pt doing well on CPAP. Pt advised to call CPAP supplier and get changed over to a full face mask. Continue using CPAP with sterile water in the humidifier. Completed Refills   Requested Prescriptions      No prescriptions requested or ordered in this encounter     No follow-ups on file. No orders of the defined types were placed in this encounter. No orders of the defined types were placed in this encounter. Patient Instructions   SURVEY:    You may be receiving a survey from [employer] regarding your visit today. Please complete the survey to enable us to provide the highest quality of care to you and your family. If you cannot score us a very good (5 Stars) on any question, please call the office to discuss how we could have made your experience a very good one. Thank you.     Clinical Care Team: Markos Norris, MELODY-YASSINE Mary Walsh LPN    Clerical Team: [place] [facility]        Electronically signed by MELODY Bills CNP on 8/26/2021 at 3:46 PM           Completed Refills      Requested Prescriptions      No prescriptions requested or ordered in this encounter         Tracy Rivero received counseling on the following healthy behaviors: medication adherence  Reviewed prior labs and health maintenance. Continue current medications, diet and exercise. Discussed use, benefit, and side effects of prescribed medications. Barriers to medication compliance addressed. Patient given educational materials - see patient instructions. All patient questions answered. Patient voiced understanding.

## 2022-05-16 ENCOUNTER — TELEPHONE (OUTPATIENT)
Dept: FAMILY MEDICINE CLINIC | Age: 32
End: 2022-05-16

## 2022-05-16 NOTE — TELEPHONE ENCOUNTER
Pt called stating he is having heart palpations x one week, pt denies SOB, left are pain. Offered pt an AM appointment for tomorrow,pt denied stated he will go to the ER.

## 2022-05-19 ENCOUNTER — TELEPHONE (OUTPATIENT)
Dept: FAMILY MEDICINE CLINIC | Age: 32
End: 2022-05-19

## 2022-05-19 ENCOUNTER — HOSPITAL ENCOUNTER (OUTPATIENT)
Age: 32
Discharge: HOME | End: 2022-05-19
Payer: COMMERCIAL

## 2022-05-19 ENCOUNTER — OFFICE VISIT (OUTPATIENT)
Dept: FAMILY MEDICINE CLINIC | Age: 32
End: 2022-05-19
Payer: COMMERCIAL

## 2022-05-19 VITALS
DIASTOLIC BLOOD PRESSURE: 84 MMHG | WEIGHT: 315 LBS | HEART RATE: 84 BPM | SYSTOLIC BLOOD PRESSURE: 122 MMHG | TEMPERATURE: 97.6 F | OXYGEN SATURATION: 97 % | BODY MASS INDEX: 51.74 KG/M2

## 2022-05-19 DIAGNOSIS — I51.7 VENTRICULAR HYPERTROPHY: ICD-10-CM

## 2022-05-19 DIAGNOSIS — I49.3 FREQUENT PVCS: ICD-10-CM

## 2022-05-19 DIAGNOSIS — I49.3 FREQUENT PVCS: Primary | ICD-10-CM

## 2022-05-19 DIAGNOSIS — G47.33 OSA (OBSTRUCTIVE SLEEP APNEA): ICD-10-CM

## 2022-05-19 LAB
EKG ATRIAL RATE: 85 BPM
EKG P AXIS: 46 DEGREES
EKG P-R INTERVAL: 168 MS
EKG Q-T INTERVAL: 376 MS
EKG QRS DURATION: 92 MS
EKG QTC CALCULATION (BAZETT): 447 MS
EKG R AXIS: 64 DEGREES
EKG T AXIS: 36 DEGREES
EKG VENTRICULAR RATE: 85 BPM

## 2022-05-19 PROCEDURE — 93005 ELECTROCARDIOGRAM TRACING: CPT

## 2022-05-19 PROCEDURE — 93010 ELECTROCARDIOGRAM REPORT: CPT | Performed by: INTERNAL MEDICINE

## 2022-05-19 PROCEDURE — 99213 OFFICE O/P EST LOW 20 MIN: CPT | Performed by: NURSE PRACTITIONER

## 2022-05-19 ASSESSMENT — ENCOUNTER SYMPTOMS
VOMITING: 0
DIARRHEA: 0
COUGH: 0
SHORTNESS OF BREATH: 0
NAUSEA: 0

## 2022-05-19 ASSESSMENT — PATIENT HEALTH QUESTIONNAIRE - PHQ9
SUM OF ALL RESPONSES TO PHQ QUESTIONS 1-9: 0
SUM OF ALL RESPONSES TO PHQ QUESTIONS 1-9: 0
2. FEELING DOWN, DEPRESSED OR HOPELESS: 0
SUM OF ALL RESPONSES TO PHQ QUESTIONS 1-9: 0
SUM OF ALL RESPONSES TO PHQ9 QUESTIONS 1 & 2: 0
SUM OF ALL RESPONSES TO PHQ QUESTIONS 1-9: 0
1. LITTLE INTEREST OR PLEASURE IN DOING THINGS: 0

## 2022-05-19 NOTE — TELEPHONE ENCOUNTER
----- Message from MELODY Wallace CNP sent at 5/19/2022 12:24 PM EDT -----  Please call pt and tell him I spoke with Dr Hope Andrade. He said to do a 48 hour holter monitor along with the EKG and echo.

## 2022-05-19 NOTE — PROGRESS NOTES
HPI Notes    Name: Anastacio Troy  : 1990         Chief Complaint:     Chief Complaint   Patient presents with    Palpitations     Patient here today for ED followup. Was at [facility] ED on 22 and dx with heart palpitations, PVC's and dehydration. History of Present Illness:        HPI  Patient is a 52-year-old male who reports for ED follow-up. Patient was seen at [facility] for complaints of palpitations. EKG showed frequent PVCs. Pt has been drinking monster energy drinks every morning. Past Medical History:     Past Medical History:   Diagnosis Date    Back pain       Reviewed all health maintenance requirements and ordered appropriate tests  Health Maintenance Due   Topic Date Due    Varicella vaccine (1 of 2 - 2-dose childhood series) Never done    COVID-19 Vaccine (1) Never done    HIV screen  Never done    Hepatitis C screen  Never done    DTaP/Tdap/Td vaccine (1 - Tdap) Never done       Past Surgical History:     Past Surgical History:   Procedure Laterality Date    CLEFT PALATE REPAIR          Medications:       Prior to Admission medications    Medication Sig Start Date End Date Taking? Authorizing Provider   CPAP Machine MISC by Does not apply route   Yes Historical Provider, MD        Allergies:       Patient has no known allergies. Social History:     Tobacco:    reports that he has never smoked. He has never used smokeless tobacco.  Alcohol:      reports previous alcohol use. Drug Use:  reports no history of drug use. Family History:     No family history on file. Review of Systems:         Review of Systems   Constitutional: Negative for chills and fever. Respiratory: Negative for cough and shortness of breath. Cardiovascular: Positive for palpitations. Negative for chest pain. Gastrointestinal: Negative for diarrhea, nausea and vomiting. Neurological: Negative for dizziness, seizures and headaches.          Physical Exam:     Vitals:  /84 Pulse 84   Temp 97.6 °F (36.4 °C) (Oral)   Wt (!) 371 lb (168.3 kg)   SpO2 97%   BMI 51.74 kg/m²       Physical Exam  Vitals and nursing note reviewed. Constitutional:       Appearance: He is well-developed. Cardiovascular:      Rate and Rhythm: Normal rate and regular rhythm. Heart sounds: S1 normal and S2 normal.   Pulmonary:      Effort: Pulmonary effort is normal. No respiratory distress. Breath sounds: Normal breath sounds. Abdominal:      General: Bowel sounds are normal.      Palpations: Abdomen is soft. Tenderness: There is no abdominal tenderness. Skin:     General: Skin is warm and dry. Psychiatric:         Behavior: Behavior is cooperative. Data:     No results found for: NA, K, CL, CO2, BUN, CREATININE, GLUCOSE, PROT, LABALBU, BILITOT, ALKPHOS, AST, ALT  No results found for: WBC, RBC, HGB, HCT, MCV, MCH, MCHC, RDW, PLT, MPV  No results found for: TSH  No results found for: CHOL, LDL, HDL, PSA, LABA1C       Assessment & Plan        Diagnosis Orders   1. Frequent PVCs  ECHO 2D WO Color Doppler Complete    EKG 12 Lead   2. Ventricular hypertrophy  ECHO 2D WO Color Doppler Complete    EKG 12 Lead   3. LIBBY (obstructive sleep apnea)       Will send for repeat EKG and cardiac echo. Pt educated to stop drinking energy drinks and decrease caffeine in diet. Encouraged to use CPAP every night. Completed Refills   Requested Prescriptions      No prescriptions requested or ordered in this encounter     No follow-ups on file. No orders of the defined types were placed in this encounter.     Orders Placed This Encounter   Procedures    EKG 12 Lead     Standing Status:   Future     Number of Occurrences:   1     Standing Expiration Date:   5/19/2023     Order Specific Question:   Reason for Exam?     Answer:   Irregular heart rate    ECHO 2D WO Color Doppler Complete     Standing Status:   Future     Standing Expiration Date:   5/19/2023     Order Specific Question:   Reason for exam:     Answer:   Frequent PVCs, LIBBY somewhat treated         Patient Instructions   SURVEY:    You may be receiving a survey from [employer] regarding your visit today. Please complete the survey to enable us to provide the highest quality of care to you and your family. If you cannot score us a very good (5 Stars) on any question, please call the office to discuss how we could have made your experience a very good one. Thank you.     Clinical Care Team: VALORIE Gonzalez LPN    Clerical Team: Lenka Dunn        Electronically signed by MELODY Gonzalez CNP on 5/19/2022 at 12:15 PM           Completed Refills   Requested Prescriptions      No prescriptions requested or ordered in this encounter

## 2022-05-25 ENCOUNTER — HOSPITAL ENCOUNTER (OUTPATIENT)
Dept: NON INVASIVE DIAGNOSTICS | Age: 32
Discharge: HOME | End: 2022-05-25
Payer: COMMERCIAL

## 2022-05-25 DIAGNOSIS — I49.3 FREQUENT PVCS: ICD-10-CM

## 2022-05-25 DIAGNOSIS — I51.7 VENTRICULAR HYPERTROPHY: ICD-10-CM

## 2022-05-25 LAB
LV EF: 60 %
LVEF MODALITY: NORMAL

## 2022-05-25 PROCEDURE — 93226 XTRNL ECG REC<48 HR SCAN A/R: CPT

## 2022-05-25 PROCEDURE — 93225 XTRNL ECG REC<48 HRS REC: CPT

## 2022-05-25 PROCEDURE — 93306 TTE W/DOPPLER COMPLETE: CPT

## 2022-05-29 LAB
ACQUISITION DURATION: NORMAL S
AVERAGE HEART RATE: 85 BPM
EKG DIAGNOSIS: NORMAL
HOLTER MAX HEART RATE: 131 BPM
HOOKUP DATE: NORMAL
HOOKUP TIME: NORMAL
MAX HEART RATE TIME/DATE: NORMAL
MIN HEART RATE TIME/DATE: NORMAL
MIN HEART RATE: 57 BPM
NUMBER OF QRS COMPLEXES: NORMAL
NUMBER OF SUPRAVENTRICULAR COUPLETS: 1
NUMBER OF SUPRAVENTRICULAR ECTOPICS: NORMAL
NUMBER OF SUPRAVENTRICULAR ISOLATED BEATS: NORMAL
NUMBER OF VENTRICULAR BIGEMINAL CYCLES: 0
NUMBER OF VENTRICULAR COUPLETS: 0
NUMBER OF VENTRICULAR ECTOPICS: 0

## 2022-06-06 ENCOUNTER — OFFICE VISIT (OUTPATIENT)
Dept: FAMILY MEDICINE CLINIC | Age: 32
End: 2022-06-06
Payer: COMMERCIAL

## 2022-06-06 VITALS
BODY MASS INDEX: 42.66 KG/M2 | HEIGHT: 72 IN | SYSTOLIC BLOOD PRESSURE: 122 MMHG | WEIGHT: 315 LBS | OXYGEN SATURATION: 97 % | DIASTOLIC BLOOD PRESSURE: 82 MMHG | HEART RATE: 78 BPM

## 2022-06-06 DIAGNOSIS — H53.8 BLURRY VISION, BILATERAL: Primary | ICD-10-CM

## 2022-06-06 DIAGNOSIS — Z78.9 PATIENT ON KETOGENIC DIET: ICD-10-CM

## 2022-06-06 LAB — HBA1C MFR BLD: 5.6 %

## 2022-06-06 PROCEDURE — 99213 OFFICE O/P EST LOW 20 MIN: CPT | Performed by: NURSE PRACTITIONER

## 2022-06-06 PROCEDURE — 83036 HEMOGLOBIN GLYCOSYLATED A1C: CPT | Performed by: NURSE PRACTITIONER

## 2022-06-06 SDOH — ECONOMIC STABILITY: FOOD INSECURITY: WITHIN THE PAST 12 MONTHS, THE FOOD YOU BOUGHT JUST DIDN'T LAST AND YOU DIDN'T HAVE MONEY TO GET MORE.: NEVER TRUE

## 2022-06-06 SDOH — ECONOMIC STABILITY: FOOD INSECURITY: WITHIN THE PAST 12 MONTHS, YOU WORRIED THAT YOUR FOOD WOULD RUN OUT BEFORE YOU GOT MONEY TO BUY MORE.: NEVER TRUE

## 2022-06-06 ASSESSMENT — SOCIAL DETERMINANTS OF HEALTH (SDOH): HOW HARD IS IT FOR YOU TO PAY FOR THE VERY BASICS LIKE FOOD, HOUSING, MEDICAL CARE, AND HEATING?: NOT HARD AT ALL

## 2022-06-06 ASSESSMENT — ENCOUNTER SYMPTOMS
VOMITING: 0
NAUSEA: 0
COUGH: 0
DIARRHEA: 0
SHORTNESS OF BREATH: 0

## 2022-06-06 ASSESSMENT — VISUAL ACUITY: OU: 1

## 2022-06-06 NOTE — PROGRESS NOTES
HPI Notes    Name: Arabella Archuleta  : 1990         Chief Complaint:     Chief Complaint   Patient presents with   [address]     pt wants tested for DM        History of Present Illness:        HPI  Pt is a 27 yo male who presents for blurry vision. Pt experienced some blurry vision first thing in the morning. Pt is concerned about diabetes. Pt has never been tested. Pt recently started the keto diet. Is not sure if this is causing it or not. Has been feeling the \"keto flu\". Past Medical History:     Past Medical History:   Diagnosis Date    Back pain       Reviewed all health maintenance requirements and ordered appropriate tests  Health Maintenance Due   Topic Date Due    Varicella vaccine (1 of 2 - 2-dose childhood series) Never done    COVID-19 Vaccine (1) Never done    HIV screen  Never done    Hepatitis C screen  Never done    DTaP/Tdap/Td vaccine (1 - Tdap) Never done       Past Surgical History:     Past Surgical History:   Procedure Laterality Date    CLEFT PALATE REPAIR          Medications:       Prior to Admission medications    Medication Sig Start Date End Date Taking? Authorizing Provider   CPAP Machine MISC by Does not apply route  Patient not taking: Reported on 2022    Historical Provider, MD        Allergies:       Patient has no known allergies. Social History:     Tobacco:    reports that he has never smoked. He has never used smokeless tobacco.  Alcohol:      reports previous alcohol use. Drug Use:  reports no history of drug use. Family History:     No family history on file. Review of Systems:         Review of Systems   Constitutional: Negative for chills and fever. Respiratory: Negative for cough and shortness of breath. Cardiovascular: Negative for chest pain and palpitations. Gastrointestinal: Negative for diarrhea, nausea and vomiting. Neurological: Negative for dizziness, seizures and headaches.          Physical Exam:     Vitals:  /82 Pulse 78   Ht 6' (1.829 m)   Wt (!) 368 lb (166.9 kg)   SpO2 97%   BMI 49.91 kg/m²       Physical Exam  Vitals and nursing note reviewed. Constitutional:       Appearance: He is well-developed. Eyes:      General: Vision grossly intact. Pupils: Pupils are equal, round, and reactive to light. Cardiovascular:      Rate and Rhythm: Normal rate and regular rhythm. Heart sounds: S1 normal and S2 normal.   Pulmonary:      Effort: Pulmonary effort is normal. No respiratory distress. Breath sounds: Normal breath sounds. Abdominal:      General: Bowel sounds are normal.      Palpations: Abdomen is soft. Tenderness: There is no abdominal tenderness. Skin:     General: Skin is warm and dry. Psychiatric:         Behavior: Behavior is cooperative. Data:     No results found for: NA, K, CL, CO2, BUN, CREATININE, GLUCOSE, PROT, LABALBU, BILITOT, ALKPHOS, AST, ALT  No results found for: WBC, RBC, HGB, HCT, MCV, MCH, MCHC, RDW, PLT, MPV  No results found for: TSH  No results found for: CHOL, LDL, HDL, PSA, LABA1C       Assessment & Plan        Diagnosis Orders   1. Blurry vision, bilateral     2. Patient on ketogenic diet       Mostly likely side effect of new keto diet. Pt recently had eye exam. Continue healthy eating and good hydration. Patient verbalizes understanding and agreement with plan. All questions answered. If symptoms do not resolve or worsen, return to office. Completed Refills   Requested Prescriptions      No prescriptions requested or ordered in this encounter     No follow-ups on file. No orders of the defined types were placed in this encounter. No orders of the defined types were placed in this encounter. There are no Patient Instructions on file for this visit.     Electronically signed by MELODY Sparks CNP on 6/6/2022 at 3:47 PM           Completed Refills   Requested Prescriptions      No prescriptions requested or ordered in this encounter

## 2022-06-15 ENCOUNTER — TELEPHONE (OUTPATIENT)
Dept: FAMILY MEDICINE CLINIC | Age: 32
End: 2022-06-15

## 2022-06-15 NOTE — TELEPHONE ENCOUNTER
Pt called stating that hs is having lower back pain and the pain is running down the right leg, pt stated he is unable to walk. He has taken Aleve. Pt asking for antiinflammatory  Pt uses [facility].  Please advise

## 2022-06-16 RX ORDER — DICLOFENAC SODIUM 75 MG/1
75 TABLET, DELAYED RELEASE ORAL 2 TIMES DAILY
Qty: 60 TABLET | Refills: 2 | Status: SHIPPED | OUTPATIENT
Start: 2022-06-16 | End: 2022-08-19 | Stop reason: ALTCHOICE

## 2022-06-17 ENCOUNTER — TELEPHONE (OUTPATIENT)
Dept: FAMILY MEDICINE CLINIC | Age: 32
End: 2022-06-17

## 2022-06-17 NOTE — TELEPHONE ENCOUNTER
I'll do it this time, but generally notes are when I told the pt to stay home, NOT when they decided to stay home and then want me to cover them.

## 2022-06-17 NOTE — LETTER
[address]  [address] [address]  Phone: [phone]  Fax: [address], MELODY - CNP        2022    [address] [place], [place] [address] [address]   1990    To Whom It May Concern,    Please excuse Ruchi Hernandez from work on 6/15/22, 22 and 22 due to medical illness. Patient may return to work on 22. If you have any questions or concerns, please don't hesitate to call.     Sincerely,        Katheran Saint, MELODY - CNP

## 2022-06-17 NOTE — TELEPHONE ENCOUNTER
Power Sandy was off work on 6/15, 6/16, and 6/17 due to his back pain. He called in yesterday and prescribed voltaren. He said that has worked and he feels much better. He would like to know if he can have an excuse for those 3 days. Please let Power Sandy know. Health Maintenance   Topic Date Due    Varicella vaccine (1 of 2 - 2-dose childhood series) Never done    COVID-19 Vaccine (1) Never done    HIV screen  Never done    Hepatitis C screen  Never done    DTaP/Tdap/Td vaccine (1 - Tdap) Never done    Flu vaccine (Season Ended) 09/01/2022    Depression Screen  05/19/2023    Hepatitis A vaccine  Aged Out    Hepatitis B vaccine  Aged Out    Hib vaccine  Aged Out    Meningococcal (ACWY) vaccine  Aged Out    Pneumococcal 0-64 years Vaccine  Aged Out             (applicable per patient's age: Cancer Screenings, Depression Screening, Fall Risk Screening, Immunizations)    Hemoglobin A1C (%)   Date Value   06/06/2022 5.6      (goal A1C is < 7)   (goal LDL is <100) need 30-50% reduction from baseline     BP Readings from Last 3 Encounters:   06/06/22 122/82   05/19/22 122/84   08/26/21 112/80    (goal /80)      All Future Testing planned in [facility]:  Lab Frequency Next Occurrence       Next Visit Date:  No future appointments.          Patient Active Problem List:     Intractable back pain     LIBBY (obstructive sleep apnea)

## 2022-08-19 ENCOUNTER — OFFICE VISIT (OUTPATIENT)
Dept: FAMILY MEDICINE CLINIC | Age: 32
End: 2022-08-19
Payer: COMMERCIAL

## 2022-08-19 ENCOUNTER — HOSPITAL ENCOUNTER (OUTPATIENT)
Age: 32
Discharge: HOME | End: 2022-08-19
Payer: COMMERCIAL

## 2022-08-19 VITALS
DIASTOLIC BLOOD PRESSURE: 60 MMHG | TEMPERATURE: 98 F | HEART RATE: 78 BPM | BODY MASS INDEX: 44.62 KG/M2 | WEIGHT: 315 LBS | SYSTOLIC BLOOD PRESSURE: 114 MMHG | OXYGEN SATURATION: 98 %

## 2022-08-19 DIAGNOSIS — Z78.9 KETOGENIC DIET: ICD-10-CM

## 2022-08-19 DIAGNOSIS — R23.2 HOT FLASH IN MALE: ICD-10-CM

## 2022-08-19 DIAGNOSIS — R23.2 HOT FLASH IN MALE: Primary | ICD-10-CM

## 2022-08-19 LAB
ABSOLUTE EOS #: 0.1 K/UL (ref 0–0.4)
ABSOLUTE LYMPH #: 2.7 K/UL (ref 1–4.8)
ABSOLUTE MONO #: 0.9 K/UL (ref 0–1)
ALBUMIN SERPL-MCNC: 4.8 G/DL (ref 3.5–5.2)
ALP BLD-CCNC: 97 U/L (ref 40–129)
ALT SERPL-CCNC: 20 U/L (ref 5–41)
ANION GAP SERPL CALCULATED.3IONS-SCNC: 13 MMOL/L (ref 9–17)
AST SERPL-CCNC: 16 U/L
BASOPHILS # BLD: 0 % (ref 0–2)
BASOPHILS ABSOLUTE: 0 K/UL (ref 0–0.2)
BILIRUB SERPL-MCNC: 0.38 MG/DL (ref 0.3–1.2)
BUN BLDV-MCNC: 16 MG/DL (ref 6–20)
BUN/CREAT BLD: 20 (ref 9–20)
CALCIUM SERPL-MCNC: 10.1 MG/DL (ref 8.6–10.4)
CHLORIDE BLD-SCNC: 103 MMOL/L (ref 98–107)
CO2: 26 MMOL/L (ref 20–31)
CREAT SERPL-MCNC: 0.81 MG/DL (ref 0.7–1.2)
DIFFERENTIAL TYPE: YES
EOSINOPHILS RELATIVE PERCENT: 1 % (ref 0–5)
GFR AFRICAN AMERICAN: >60 ML/MIN
GFR NON-AFRICAN AMERICAN: >60 ML/MIN
GFR SERPL CREATININE-BSD FRML MDRD: NORMAL ML/MIN/{1.73_M2}
GLUCOSE BLD-MCNC: 92 MG/DL (ref 70–99)
HCT VFR BLD CALC: 45.4 % (ref 41–53)
HEMOGLOBIN: 15.1 G/DL (ref 13.5–17.5)
LYMPHOCYTES # BLD: 21 % (ref 13–44)
MAGNESIUM: 2 MG/DL (ref 1.6–2.6)
MCH RBC QN AUTO: 27.3 PG (ref 26–34)
MCHC RBC AUTO-ENTMCNC: 33.4 G/DL (ref 31–37)
MCV RBC AUTO: 81.6 FL (ref 80–100)
MONOCYTES # BLD: 7 % (ref 5–9)
PDW BLD-RTO: 13.9 % (ref 12.1–15.2)
PLATELET # BLD: 255 K/UL (ref 140–450)
POTASSIUM SERPL-SCNC: 4.3 MMOL/L (ref 3.7–5.3)
RBC # BLD: 5.56 M/UL (ref 4.5–5.9)
SEG NEUTROPHILS: 71 % (ref 39–75)
SEGMENTED NEUTROPHILS ABSOLUTE COUNT: 9.3 K/UL (ref 2.1–6.5)
SODIUM BLD-SCNC: 142 MMOL/L (ref 135–144)
TOTAL PROTEIN: 7.2 G/DL (ref 6.4–8.3)
WBC # BLD: 13 K/UL (ref 3.5–11)

## 2022-08-19 PROCEDURE — 83735 ASSAY OF MAGNESIUM: CPT

## 2022-08-19 PROCEDURE — 85025 COMPLETE CBC W/AUTO DIFF WBC: CPT

## 2022-08-19 PROCEDURE — 36415 COLL VENOUS BLD VENIPUNCTURE: CPT

## 2022-08-19 PROCEDURE — 99213 OFFICE O/P EST LOW 20 MIN: CPT | Performed by: NURSE PRACTITIONER

## 2022-08-19 PROCEDURE — 80053 COMPREHEN METABOLIC PANEL: CPT

## 2022-08-19 ASSESSMENT — ENCOUNTER SYMPTOMS
NAUSEA: 0
COUGH: 0
SHORTNESS OF BREATH: 0
VOMITING: 0
DIARRHEA: 0

## 2022-08-19 NOTE — PROGRESS NOTES
headaches. Physical Exam:     Vitals:  /60   Pulse 78   Temp 98 °F (36.7 °C) (Oral)   Wt (!) 329 lb (149.2 kg)   SpO2 98%   BMI 44.62 kg/m²       Physical Exam  Vitals and nursing note reviewed. Constitutional:       Appearance: He is well-developed. Cardiovascular:      Rate and Rhythm: Normal rate and regular rhythm. Heart sounds: S1 normal and S2 normal.   Pulmonary:      Effort: Pulmonary effort is normal. No respiratory distress. Breath sounds: Normal breath sounds. Abdominal:      General: Bowel sounds are normal.      Palpations: Abdomen is soft. Tenderness: There is no abdominal tenderness. Skin:     General: Skin is warm and dry. Psychiatric:         Behavior: Behavior is cooperative. Data:     No results found for: NA, K, CL, CO2, BUN, CREATININE, GLUCOSE, PROT, LABALBU, BILITOT, ALKPHOS, AST, ALT  No results found for: WBC, RBC, HGB, HCT, MCV, MCH, MCHC, RDW, PLT, MPV  No results found for: TSH  Lab Results   Component Value Date/Time    LABA1C 5.6 06/06/2022 03:34 PM          Assessment & Plan        Diagnosis Orders   1. Hot flash in male  CBC with Auto Differential    Comprehensive Metabolic Panel    Magnesium      2. Ketogenic diet  CBC with Auto Differential    Comprehensive Metabolic Panel    Magnesium        Most likely related to keto diet. Pt is NOT dehydrated. Will send for labs to check electrolytes and Mg. Patient verbalizes understanding and agreement with plan. All questions answered. If symptoms do not resolve or worsen, return to office. Completed Refills   Requested Prescriptions      No prescriptions requested or ordered in this encounter     No follow-ups on file. No orders of the defined types were placed in this encounter.     Orders Placed This Encounter   Procedures    CBC with Auto Differential     Standing Status:   Future     Number of Occurrences:   1     Standing Expiration Date:   9/23/2023 Comprehensive Metabolic Panel     Standing Status:   Future     Number of Occurrences:   1     Standing Expiration Date:   9/23/2023    Magnesium     Standing Status:   Future     Number of Occurrences:   1     Standing Expiration Date:   8/19/2023         Patient Instructions   SURVEY:    You may be receiving a survey from [employer] regarding your visit today. Please complete the survey to enable us to provide the highest quality of care to you and your family. If you cannot score us a very good (5 Stars) on any question, please call the office to discuss how we could have made your experience a very good one. Thank you.     Clinical Care Team: VALORIE Singh LPN    Clerical Team: Aisha Driscoll   Electronically signed by MELODY Singh CNP on 8/19/2022 at 4:50 PM           Completed Refills   Requested Prescriptions      No prescriptions requested or ordered in this encounter

## 2022-08-25 ENCOUNTER — TELEPHONE (OUTPATIENT)
Dept: FAMILY MEDICINE CLINIC | Age: 32
End: 2022-08-25

## 2022-08-25 NOTE — TELEPHONE ENCOUNTER
Last OV 8/19/22 for hot flash in male, ketogenic diet. Patient had OV scheduled for today and he had to cancel due to working over. He was coming in for dizziness and headache. VALORIE Shaw reviewed patient's chart and suggested to patient to go off KETO diet x 2 weeks and see if he feels better.    Patient voices understanding

## 2022-09-05 ENCOUNTER — HOSPITAL ENCOUNTER (EMERGENCY)
Age: 32
Discharge: HOME | End: 2022-09-06
Attending: FAMILY MEDICINE
Payer: COMMERCIAL

## 2022-09-05 VITALS
OXYGEN SATURATION: 99 % | WEIGHT: 315 LBS | BODY MASS INDEX: 44.1 KG/M2 | HEART RATE: 88 BPM | HEIGHT: 71 IN | RESPIRATION RATE: 20 BRPM | SYSTOLIC BLOOD PRESSURE: 151 MMHG | TEMPERATURE: 98.2 F | DIASTOLIC BLOOD PRESSURE: 91 MMHG

## 2022-09-05 DIAGNOSIS — R23.2 HOT FLASH IN MALE: Primary | ICD-10-CM

## 2022-09-05 PROCEDURE — 99282 EMERGENCY DEPT VISIT SF MDM: CPT

## 2022-09-05 ASSESSMENT — PAIN SCALES - GENERAL: PAINLEVEL_OUTOF10: 3

## 2022-09-05 ASSESSMENT — PAIN DESCRIPTION - DESCRIPTORS: DESCRIPTORS: CRAMPING

## 2022-09-05 ASSESSMENT — PAIN - FUNCTIONAL ASSESSMENT: PAIN_FUNCTIONAL_ASSESSMENT: 0-10

## 2022-09-05 ASSESSMENT — PAIN DESCRIPTION - LOCATION: LOCATION: ARM

## 2022-09-05 NOTE — LETTER
[facility] ED  [address], [address]  Phone: [phone]               September 6, 2022    Patient: Izzy Doss   YOB: 1990   Date of Visit: 9/5/2022       To Whom It May Concern: Alyssa Olmedo was seen and treated in our emergency department on 9/5/2022. He may return to work on 9/7/2022.       Sincerely,       Shaye Aguilar RN         Signature:__________________________________

## 2022-09-06 ENCOUNTER — HOSPITAL ENCOUNTER (OUTPATIENT)
Age: 32
Discharge: HOME | End: 2022-09-06
Payer: COMMERCIAL

## 2022-09-06 DIAGNOSIS — R51.9 ACUTE NONINTRACTABLE HEADACHE, UNSPECIFIED HEADACHE TYPE: ICD-10-CM

## 2022-09-06 DIAGNOSIS — R42 DIZZINESS: ICD-10-CM

## 2022-09-06 DIAGNOSIS — R23.2 HOT FLASH IN MALE: ICD-10-CM

## 2022-09-06 DIAGNOSIS — R00.2 PALPITATIONS: ICD-10-CM

## 2022-09-06 DIAGNOSIS — R23.2 HOT FLASH IN MALE: Primary | ICD-10-CM

## 2022-09-06 LAB
FOLLICLE STIMULATING HORMONE: 11.4 MIU/ML (ref 1.5–12.4)
LH: 11.7 MIU/ML (ref 1.7–8.6)
TESTOSTERONE TOTAL: 205 NG/DL (ref 220–1000)

## 2022-09-06 PROCEDURE — 82671 ASSAY OF ESTROGENS: CPT

## 2022-09-06 PROCEDURE — 82384 ASSAY THREE CATECHOLAMINES: CPT

## 2022-09-06 PROCEDURE — 36415 COLL VENOUS BLD VENIPUNCTURE: CPT

## 2022-09-06 PROCEDURE — 83835 ASSAY OF METANEPHRINES: CPT

## 2022-09-06 PROCEDURE — 84403 ASSAY OF TOTAL TESTOSTERONE: CPT

## 2022-09-06 PROCEDURE — 83001 ASSAY OF GONADOTROPIN (FSH): CPT

## 2022-09-06 PROCEDURE — 83002 ASSAY OF GONADOTROPIN (LH): CPT

## 2022-09-06 ASSESSMENT — ENCOUNTER SYMPTOMS
PHOTOPHOBIA: 0
VOMITING: 0
DIARRHEA: 1

## 2022-09-06 NOTE — ED NOTES
BP lying 131/86 and pulse 86  BP sitting 136/94 and pulse 84  BP standing 139/89 and pulse [facility] 142, [address] [facility]  09/06/22 0021

## 2022-09-06 NOTE — PROGRESS NOTES
Please let pt know that I spoke with ED doctor Dr Evelia Carlisle about his most recent ED visit. I would like to do a blood test to see if he has an adrenal tumor. If this test is positive, we will do an MRI abd.

## 2022-09-06 NOTE — ED PROVIDER NOTES
[address] [facility]  eMERGENCY dEPARTMENT eNCOUnter          [facility]       Chief Complaint   Patient presents with    Other     Pt states he has been to the ED 3 times in the past 3 weeks because he has periods similar to \"hot flashes\", where he gets \"very hot, pounding headache and like I'm going to pass out\"       Nurses Notes reviewed and I agree except as noted in the HPI. HISTORY OF PRESENT ILLNESS    Eugenio Mason is a 28 y.o. male who presents to the emergency room via private vehicle, patient saying that \"feeling off, something not right\", patient states been to the ER 3 times in the past 3 weeks, states he has episodes of \"hot flashes\", states at times he gets \"very hot, with pounding headache\" and feel he is going to pass out though denies any syncope events, patient is onset of symptoms over 1 month ago as had been on the keto diet and was told to stop it through his PCP. Patient denies any excessive use of stimulants including caffeine or energy drinks, denies any illicit drug use including cocaine or methamphetamines, denies new or changes in medications. Denies any cardiac history denies any thyroid history. Denies any cardiac history. Patient came to the emergency room thinking an MRI might be helpful. REVIEW OF SYSTEMS     Review of Systems   Constitutional:  Positive for fatigue. Negative for fever. Eyes:  Negative for photophobia and visual disturbance. Gastrointestinal:  Positive for diarrhea. Negative for vomiting. Neurological:  Positive for headaches. Negative for syncope. All other systems reviewed and are negative. PAST MEDICAL HISTORY    has a past medical history of Back pain. SURGICAL HISTORY      has a past surgical history that includes Cleft palate repair.     CURRENT MEDICATIONS       Discharge Medication List as of 9/6/2022  1:03 AM        CONTINUE these medications which have NOT CHANGED    Details   CPAP Machine MISC Historical Med             ALLERGIES     has No Known Allergies. FAMILY HISTORY     He indicated that his mother is alive. He indicated that his father is alive. He indicated that both of his sisters are alive. He indicated that his brother is alive. He indicated that his maternal grandmother is alive. He indicated that his maternal grandfather is alive. He indicated that his paternal grandmother is alive. He indicated that his paternal grandfather is alive. family history is not on file. SOCIAL HISTORY      reports that he has never smoked. He has never used smokeless tobacco. He reports that he does not currently use alcohol. He reports that he does not use drugs. PHYSICAL EXAM     INITIAL VITALS:  height is 5' 11\" (1.803 m) and weight is 334 lb (151.5 kg) (abnormal). His oral temperature is 98.2 °F (36.8 °C). His blood pressure is 151/91 (abnormal) and his pulse is 88. His respiration is 20 and oxygen saturation is 99%. Physical Exam   Constitutional: Patient is oriented to person, place, and time. Patient appears well-developed and well-nourished. Patient is active and cooperative. HENT:   Head: Normocephalic and atraumatic. Head is without contusion. Right Ear: Hearing and external ear normal. No drainage. Left Ear: Hearing and external ear normal. No drainage. Nose: Nose normal. No nasal deformity. No epistaxis. Mouth/Throat: Mucous membranes are not dry. Eyes: EOMI. Conjunctivae, sclera, and lids are normal. Right eye exhibits no discharge. Left eye exhibits no discharge. Neck: Full passive range of motion without pain and phonation normal.   Cardiovascular:  Normal rate, regular rhythm and intact distal pulses. Pulses: Right radial pulse  2+   Pulmonary/Chest: Effort normal. No tachypnea and no bradypnea.    Abdominal: BMI 46.5, Patient without distension   Musculoskeletal:   Negative acute trauma or deformity,  apparent full range of motion and normal strength all extremities appropriate to age. Neurological: Patient is alert and oriented to person, place, and time. patient displays no tremor. Patient displays no seizure activity. Normal observed gait. CN II-XII grossly intact    Skin: Skin is warm and dry. Patient is not diaphoretic. Psychiatric: Patient has a normal mood and affect.  Patient speech is normal and behavior is normal. Cognition and memory are normal.     DIFFERENTIAL DIAGNOSIS:   Anxiety, pheochromocytoma, supratentorial    DIAGNOSTIC RESULTS         RADIOLOGY: non-plain film images(s) such as CT, Ultrasound and MRI are read by the radiologist.  No orders to display       LABS:   Labs Reviewed - No data to display    EMERGENCY DEPARTMENT COURSE:   Vitals:    Vitals:    09/05/22 2308   BP: (!) 151/91   Pulse: 88   Resp: 20   Temp: 98.2 °F (36.8 °C)   TempSrc: Oral   SpO2: 99%   Weight: (!) 334 lb (151.5 kg)   Height: 5' 11\" (1.803 m)     Patient history and physical exam taken at bedside, discussed patient symptoms and exam findings, did review prior work-ups through [facility], as well as work-up done through PCP, all over the past several weeks, at this time I do feel there be any additional benefit of blood work from the emergency room, I would like to get at least orthotic vital signs and will reevaluate, acknowledged    Orthostatic vital signs reviewed    Given patient's symptoms, diagnosis remains very broad, does appear patient had a Holter monitor already performed, has had extensive blood work, this time and I feel any additional blood work from the ER would be of benefit, there is some consideration for getting a 24-hour fractionated metanephrines and catecholamines test, however I will defer this to his primary care and will send message through the epic email system regarding this, patient does appoint with his PCP in 2 days and strong encouraged to keep that appointment, will avoid stimulants such as caffeine or injuries at this time, stay well-hydrated otherwise, follow-up with primary care, acknowledges    FINAL IMPRESSION      1. Hot flash in male          DISPOSITION/PLAN   Discharge    PATIENT REFERRED TO:  MELODY Wilson CNP  [address] [address]    In 2 days  Please keep stated appointment in 2 days    HOS[facility]  [address] [address]  [phone]    As needed, If symptoms worsen    DISCHARGE MEDICATIONS:  Discharge Medication List as of 9/6/2022  1:03 AM              Summation      Patient Course: Discharge    ED Medications administered this visit:  Medications - No data to display    New Prescriptions from this visit:    Discharge Medication List as of 9/6/2022  1:03 AM          Follow-up:  MELODY Wilson [facility]  [address]  [phone]    In 2 days  Please keep stated appointment in 2 days    [facility]  [address] [address]  [phone]    As needed, If symptoms worsen      Final Impression:   1.  Hot flash in male               (Please note that portions of this note were completed with a voice recognition program.  Efforts were made to edit the dictations but occasionally words are mis-transcribed.)    MD Naheed Anton MD  09/06/22 2749

## 2022-09-08 ENCOUNTER — OFFICE VISIT (OUTPATIENT)
Dept: FAMILY MEDICINE CLINIC | Age: 32
End: 2022-09-08
Payer: COMMERCIAL

## 2022-09-08 VITALS
OXYGEN SATURATION: 96 % | SYSTOLIC BLOOD PRESSURE: 116 MMHG | DIASTOLIC BLOOD PRESSURE: 80 MMHG | TEMPERATURE: 98.2 F | WEIGHT: 315 LBS | BODY MASS INDEX: 47.14 KG/M2 | HEART RATE: 84 BPM

## 2022-09-08 DIAGNOSIS — R79.89 LOW TESTOSTERONE IN MALE: Primary | ICD-10-CM

## 2022-09-08 LAB
METANEPH/PLASMA INTERP: NORMAL
METANEPHRINE: <0.1 NMOL/L (ref 0–0.49)
NORMETANEPHRINE PLASMA: 0.18 NMOL/L (ref 0–0.89)

## 2022-09-08 PROCEDURE — 99213 OFFICE O/P EST LOW 20 MIN: CPT | Performed by: NURSE PRACTITIONER

## 2022-09-08 RX ORDER — TESTOSTERONE 12.5 MG/1.25G
5 GEL TOPICAL DAILY
Qty: 60 EACH | Refills: 0 | Status: SHIPPED | OUTPATIENT
Start: 2022-09-08 | End: 2022-09-26 | Stop reason: SDUPTHER

## 2022-09-08 ASSESSMENT — ENCOUNTER SYMPTOMS
VOMITING: 0
COUGH: 0
SHORTNESS OF BREATH: 0
DIARRHEA: 0
NAUSEA: 0

## 2022-09-08 NOTE — PATIENT INSTRUCTIONS
SURVEY:    You may be receiving a survey from [employer] regarding your visit today. Please complete the survey to enable us to provide the highest quality of care to you and your family. If you cannot score us a very good (5 Stars) on any question, please call the office to discuss how we could have made your experience a very good one. Thank you.     Clinical Care Team: VALORIE Abraham LPN    Clerical Team: Ana Gannon

## 2022-09-08 NOTE — PROGRESS NOTES
HPI Notes    Name: Lata Gutierrez  : 1990         Chief Complaint:     Chief Complaint   Patient presents with    Fatigue     Patient complains of fatigue    Dizziness     Patient complains of feeling lightheaded. Patient was at ED on 22       History of Present Illness:        HPI  Pt is a 29 yo male who presents for continued symptoms of fatigue, lightheadedness, dry skin, and hot flashes. Pt also has episodes of mental fogginess. Pt has had some testing drawn but not all the results are back. Past Medical History:     Past Medical History:   Diagnosis Date    Back pain       Reviewed all health maintenance requirements and ordered appropriate tests  Health Maintenance Due   Topic Date Due    COVID-19 Vaccine (1) Never done    Varicella vaccine (1 of 2 - 2-dose childhood series) Never done    HIV screen  Never done    Hepatitis C screen  Never done    DTaP/Tdap/Td vaccine (1 - Tdap) Never done    Flu vaccine (1) Never done       Past Surgical History:     Past Surgical History:   Procedure Laterality Date    CLEFT PALATE REPAIR          Medications:       Prior to Admission medications    Medication Sig Start Date End Date Taking? Authorizing Provider   testosterone (ANDROGEL) 25 MG/2.5GM (1%) GEL 1 % gel Apply 5 g topically daily for 30 days. 9/8/22 10/8/22 Yes Eleravinder Dooley, APRN - CNP   CPAP Machine MISC by Does not apply route   Yes Historical Provider, MD        Allergies:       Patient has no known allergies. Social History:     Tobacco:    reports that he has never smoked. He has never used smokeless tobacco.  Alcohol:      reports that he does not currently use alcohol. Drug Use:  reports no history of drug use. Family History:     No family history on file. Review of Systems:         Review of Systems   Constitutional:  Negative for chills and fever. Respiratory:  Negative for cough and shortness of breath. Cardiovascular:  Negative for chest pain and palpitations. Gastrointestinal:  Negative for diarrhea, nausea and vomiting. Neurological:  Negative for dizziness, seizures and headaches. Physical Exam:     Vitals:  /80   Pulse 84   Temp 98.2 °F (36.8 °C) (Oral)   Wt (!) 338 lb (153.3 kg)   SpO2 96%   BMI 47.14 kg/m²       Physical Exam  Vitals and nursing note reviewed. Constitutional:       Appearance: He is well-developed. Cardiovascular:      Rate and Rhythm: Normal rate and regular rhythm. Heart sounds: S1 normal and S2 normal.   Pulmonary:      Effort: Pulmonary effort is normal. No respiratory distress. Breath sounds: Normal breath sounds. Abdominal:      General: Bowel sounds are normal.      Palpations: Abdomen is soft. Tenderness: There is no abdominal tenderness. Skin:     General: Skin is warm and dry. Psychiatric:         Behavior: Behavior is cooperative. Data:     Lab Results   Component Value Date/Time     08/19/2022 04:49 PM    K 4.3 08/19/2022 04:49 PM     08/19/2022 04:49 PM    CO2 26 08/19/2022 04:49 PM    BUN 16 08/19/2022 04:49 PM    CREATININE 0.81 08/19/2022 04:49 PM    GLUCOSE 92 08/19/2022 04:49 PM    PROT 7.2 08/19/2022 04:49 PM    LABALBU 4.8 08/19/2022 04:49 PM    BILITOT 0.38 08/19/2022 04:49 PM    ALKPHOS 97 08/19/2022 04:49 PM    AST 16 08/19/2022 04:49 PM    ALT 20 08/19/2022 04:49 PM     Lab Results   Component Value Date/Time    WBC 13.0 08/19/2022 04:49 PM    RBC 5.56 08/19/2022 04:49 PM    HGB 15.1 08/19/2022 04:49 PM    HCT 45.4 08/19/2022 04:49 PM    MCV 81.6 08/19/2022 04:49 PM    MCH 27.3 08/19/2022 04:49 PM    MCHC 33.4 08/19/2022 04:49 PM    RDW 13.9 08/19/2022 04:49 PM     08/19/2022 04:49 PM     No results found for: TSH  Lab Results   Component Value Date/Time    LABA1C 5.6 06/06/2022 03:34 PM          Assessment & Plan        Diagnosis Orders   1.  Low testosterone in male  testosterone (ANDROGEL) 25 MG/2.5GM (1%) GEL 1 % gel        Will start on androgel 1% 5gm daily. Still waiting on other labs and will consider MRI abd. Patient verbalizes understanding and agreement with plan. All questions answered. If symptoms do not resolve or worsen, return to office. Completed Refills   Requested Prescriptions     Signed Prescriptions Disp Refills    testosterone (ANDROGEL) 25 MG/2.5GM (1%) GEL 1 % gel 60 each 0     Sig: Apply 5 g topically daily for 30 days. No follow-ups on file. Orders Placed This Encounter   Medications    testosterone (ANDROGEL) 25 MG/2.5GM (1%) GEL 1 % gel     Sig: Apply 5 g topically daily for 30 days. Dispense:  60 each     Refill:  0     No orders of the defined types were placed in this encounter. Patient Instructions   SURVEY:    You may be receiving a survey from [employer] regarding your visit today. Please complete the survey to enable us to provide the highest quality of care to you and your family. If you cannot score us a very good (5 Stars) on any question, please call the office to discuss how we could have made your experience a very good one. Thank you. Clinical Care Team: VALORIE Fernandez LPN    Clerical Team: Aihsa Driscoll   Electronically signed by MELODY Fernandez CNP on 9/8/2022 at 5:08 PM           Completed Refills   Requested Prescriptions     Signed Prescriptions Disp Refills    testosterone (ANDROGEL) 25 MG/2.5GM (1%) GEL 1 % gel 60 each 0     Sig: Apply 5 g topically daily for 30 days.

## 2022-09-09 LAB
CATECHOLAMINE INTERPRETATION, PLASMA: ABNORMAL
EPINEPHRINE PLASMA: <10 PG/ML (ref 10–200)
NOREPINEPHRINE: 179 PG/ML (ref 80–520)

## 2022-09-12 ENCOUNTER — TELEPHONE (OUTPATIENT)
Dept: FAMILY MEDICINE CLINIC | Age: 32
End: 2022-09-12

## 2022-09-12 NOTE — TELEPHONE ENCOUNTER
Last OV: 9/8/2022 fatigue, dizziness    Next scheduled apt: Visit date not found      Patient called in requesting recent lab results and update on his prescription for Testosterone. Patient stated medication needed a prior auth.

## 2022-09-13 NOTE — TELEPHONE ENCOUNTER
Please let the patient know that his lab tests for the pheochromocytoma were negative. I do NOT think he has an adrenal tumor. The only thing that has been off has been his testosterone. We are still waiting to hear back from the insurance company if they are going to approve this or not. As soon as we hear, we will move forward with the plan.

## 2022-09-15 ENCOUNTER — TELEPHONE (OUTPATIENT)
Dept: FAMILY MEDICINE CLINIC | Age: 32
End: 2022-09-15

## 2022-09-15 DIAGNOSIS — R79.89 LOW TESTOSTERONE IN MALE: Primary | ICD-10-CM

## 2022-09-15 DIAGNOSIS — R23.2 HOT FLASH IN MALE: ICD-10-CM

## 2022-09-15 NOTE — TELEPHONE ENCOUNTER
Pt stated that the gel would cost him $ 200.00 so he didn't get it.  Stated he is going to call the pharmacy for some cheaper options

## 2022-09-15 NOTE — TELEPHONE ENCOUNTER
Have pt call his insurance company and tell them he needs testosterone replacement therapy and ask them which method is covered the best (gels, patch, IM etc)

## 2022-09-15 NOTE — TELEPHONE ENCOUNTER
Patient called to let Rome Rikcs know that he had been feeling hot in his face/head area and now it has moved to his legs. Patient states his legs feel hot, hurt and ache. Patient states kind of like hot flashes. Patient last seen 9/8/22. No future appt found. Health Maintenance   Topic Date Due    COVID-19 Vaccine (1) Never done    Varicella vaccine (1 of 2 - 2-dose childhood series) Never done    HIV screen  Never done    Hepatitis C screen  Never done    DTaP/Tdap/Td vaccine (1 - Tdap) Never done    Flu vaccine (1) Never done    Depression Screen  05/19/2023    Hepatitis A vaccine  Aged Out    Hepatitis B vaccine  Aged Out    Hib vaccine  Aged Out    Meningococcal (ACWY) vaccine  Aged Out    Pneumococcal 0-64 years Vaccine  Aged Out             (applicable per patient's age: Cancer Screenings, Depression Screening, Fall Risk Screening, Immunizations)    Hemoglobin A1C (%)   Date Value   06/06/2022 5.6     AST (U/L)   Date Value   08/19/2022 16     ALT (U/L)   Date Value   08/19/2022 20     BUN (mg/dL)   Date Value   08/19/2022 16      (goal A1C is < 7)   (goal LDL is <100) need 30-50% reduction from baseline     BP Readings from Last 3 Encounters:   09/08/22 116/80   09/05/22 (!) 151/91   08/19/22 114/60    (goal /80)      All Future Testing planned in [facility]:  Lab Frequency Next Occurrence       Next Visit Date:  No future appointments.          Patient Active Problem List:     Intractable back pain     LIBBY (obstructive sleep apnea)

## 2022-09-20 NOTE — TELEPHONE ENCOUNTER
Pt called stating that his insurance is not covering the testosterone gel.   Insurance advised him yo have the office file an appeal.

## 2022-09-21 LAB
ESTRADIOL LEVEL: 30.9 PG/ML (ref 10–42)
ESTROGEN TOTAL: 67.4 PG/ML (ref 19–69)
ESTRONE: 36.5 PG/ML (ref 9–36)

## 2022-09-21 NOTE — TELEPHONE ENCOUNTER
Testosterone ordered to be the second level prior to treatment per his insurance regulations. Please remind patient that this needs to be done before 10 AM on the day that he wants to get his blood drawn.

## 2022-09-21 NOTE — TELEPHONE ENCOUNTER
His insurance is requiring 2 levels of testosterone be drawn on two separate days to qualify for testosterone therapy.

## 2022-09-21 NOTE — TELEPHONE ENCOUNTER
Sridhar,  Patient's testosterone was denied. I put the letter of denial in your basket, you can review and let me know what to do next? He may need to be referred to specialty to get this approved. Please advise.

## 2022-09-22 ENCOUNTER — APPOINTMENT (OUTPATIENT)
Dept: GENERAL RADIOLOGY | Age: 32
End: 2022-09-22
Payer: COMMERCIAL

## 2022-09-22 ENCOUNTER — HOSPITAL ENCOUNTER (EMERGENCY)
Age: 32
Discharge: HOME | End: 2022-09-22
Attending: EMERGENCY MEDICINE
Payer: COMMERCIAL

## 2022-09-22 VITALS
TEMPERATURE: 97.9 F | HEART RATE: 89 BPM | OXYGEN SATURATION: 96 % | RESPIRATION RATE: 10 BRPM | HEIGHT: 71 IN | WEIGHT: 315 LBS | DIASTOLIC BLOOD PRESSURE: 81 MMHG | BODY MASS INDEX: 44.1 KG/M2 | SYSTOLIC BLOOD PRESSURE: 130 MMHG

## 2022-09-22 DIAGNOSIS — R07.9 CHEST PAIN, UNSPECIFIED TYPE: Primary | ICD-10-CM

## 2022-09-22 DIAGNOSIS — D72.829 LEUKOCYTOSIS, UNSPECIFIED TYPE: ICD-10-CM

## 2022-09-22 LAB
ABSOLUTE EOS #: 0.1 K/UL (ref 0–0.4)
ABSOLUTE LYMPH #: 2.9 K/UL (ref 1–4.8)
ABSOLUTE MONO #: 0.7 K/UL (ref 0–1)
ANION GAP SERPL CALCULATED.3IONS-SCNC: 11 MMOL/L (ref 9–17)
BASOPHILS # BLD: 0 % (ref 0–2)
BASOPHILS ABSOLUTE: 0 K/UL (ref 0–0.2)
BUN BLDV-MCNC: 18 MG/DL (ref 6–20)
BUN/CREAT BLD: 27 (ref 9–20)
CALCIUM SERPL-MCNC: 9.2 MG/DL (ref 8.6–10.4)
CHLORIDE BLD-SCNC: 104 MMOL/L (ref 98–107)
CO2: 24 MMOL/L (ref 20–31)
CREAT SERPL-MCNC: 0.67 MG/DL (ref 0.7–1.2)
DIFFERENTIAL TYPE: YES
EOSINOPHILS RELATIVE PERCENT: 0 % (ref 0–5)
GFR AFRICAN AMERICAN: >60 ML/MIN
GFR NON-AFRICAN AMERICAN: >60 ML/MIN
GFR SERPL CREATININE-BSD FRML MDRD: ABNORMAL ML/MIN/{1.73_M2}
GLUCOSE BLD-MCNC: 92 MG/DL (ref 70–99)
HCT VFR BLD CALC: 41.8 % (ref 41–53)
HEMOGLOBIN: 14 G/DL (ref 13.5–17.5)
LYMPHOCYTES # BLD: 20 % (ref 13–44)
MCH RBC QN AUTO: 27 PG (ref 26–34)
MCHC RBC AUTO-ENTMCNC: 33.5 G/DL (ref 31–37)
MCV RBC AUTO: 80.7 FL (ref 80–100)
MONOCYTES # BLD: 5 % (ref 5–9)
PDW BLD-RTO: 14.8 % (ref 12.1–15.2)
PLATELET # BLD: 230 K/UL (ref 140–450)
POTASSIUM SERPL-SCNC: 3.9 MMOL/L (ref 3.7–5.3)
RBC # BLD: 5.19 M/UL (ref 4.5–5.9)
SEG NEUTROPHILS: 75 % (ref 39–75)
SEGMENTED NEUTROPHILS ABSOLUTE COUNT: 10.6 K/UL (ref 2.1–6.5)
SODIUM BLD-SCNC: 139 MMOL/L (ref 135–144)
TROPONIN, HIGH SENSITIVITY: 8 NG/L (ref 0–22)
WBC # BLD: 14.4 K/UL (ref 3.5–11)

## 2022-09-22 PROCEDURE — 80048 BASIC METABOLIC PNL TOTAL CA: CPT

## 2022-09-22 PROCEDURE — 99285 EMERGENCY DEPT VISIT HI MDM: CPT

## 2022-09-22 PROCEDURE — 71046 X-RAY EXAM CHEST 2 VIEWS: CPT

## 2022-09-22 PROCEDURE — 36415 COLL VENOUS BLD VENIPUNCTURE: CPT

## 2022-09-22 PROCEDURE — 93005 ELECTROCARDIOGRAM TRACING: CPT | Performed by: EMERGENCY MEDICINE

## 2022-09-22 PROCEDURE — 84484 ASSAY OF TROPONIN QUANT: CPT

## 2022-09-22 PROCEDURE — 85025 COMPLETE CBC W/AUTO DIFF WBC: CPT

## 2022-09-22 RX ORDER — KETOROLAC TROMETHAMINE 15 MG/ML
15 INJECTION, SOLUTION INTRAMUSCULAR; INTRAVENOUS ONCE
Status: DISCONTINUED | OUTPATIENT
Start: 2022-09-22 | End: 2022-09-22

## 2022-09-22 RX ORDER — KETOROLAC TROMETHAMINE 15 MG/ML
15 INJECTION, SOLUTION INTRAMUSCULAR; INTRAVENOUS ONCE
Status: DISCONTINUED | OUTPATIENT
Start: 2022-09-22 | End: 2022-09-22 | Stop reason: HOSPADM

## 2022-09-22 ASSESSMENT — ENCOUNTER SYMPTOMS
NAUSEA: 0
VOMITING: 0
SHORTNESS OF BREATH: 0
ABDOMINAL PAIN: 0
WHEEZING: 0
DIARRHEA: 0
CHEST TIGHTNESS: 0
BACK PAIN: 0
COUGH: 0

## 2022-09-22 ASSESSMENT — PAIN DESCRIPTION - LOCATION: LOCATION: CHEST

## 2022-09-22 ASSESSMENT — PAIN DESCRIPTION - DESCRIPTORS: DESCRIPTORS: DISCOMFORT

## 2022-09-22 ASSESSMENT — PAIN SCALES - GENERAL: PAINLEVEL_OUTOF10: 2

## 2022-09-23 LAB
EKG ATRIAL RATE: 85 BPM
EKG P AXIS: 33 DEGREES
EKG P-R INTERVAL: 176 MS
EKG Q-T INTERVAL: 382 MS
EKG QRS DURATION: 86 MS
EKG QTC CALCULATION (BAZETT): 454 MS
EKG R AXIS: 51 DEGREES
EKG T AXIS: 32 DEGREES
EKG VENTRICULAR RATE: 85 BPM

## 2022-09-23 PROCEDURE — 93010 ELECTROCARDIOGRAM REPORT: CPT | Performed by: INTERNAL MEDICINE

## 2022-09-23 NOTE — ED PROVIDER NOTES
[facility] ED  eMERGENCY dEPARTMENT eNCOUnter      Pt Name: Tommy Wilson  MRN: [id number]  [place] 1990  Date of evaluation: 9/22/2022  Provider: Gita Lyons, [address] [place] [address]     Chief Complaint   Patient presents with    Chest Pain     Pt c/o CP/heaviness from L side of chest to shoulder pain only when laying down along with palpitations; states he went to [facility] ED but Kleber Lopez couldn't figure it out\"       HISTORY OF PRESENT ILLNESS    Tommy Wilson is a 28 y.o. male who presents to the emergency department from home for chest pain. States it only comes on at night when he is lying down. Describes it as a pressure on the left side that radiates into the left shoulder. States he went to [place] er on 9/11 but couldn't find out what was wrong. No history of dvt or pe. No recent travel or recent surgery. Workup at [facility] demonstrated a normal troponin. Felt this was musculoskeletal.       Triage notes and Nursing notes were reviewed by myself. Any discrepancies are addressed above. PAST MEDICAL HISTORY     Past Medical History:   Diagnosis Date    Back pain        SURGICAL HISTORY       Past Surgical History:   Procedure Laterality Date    CLEFT PALATE REPAIR         CURRENT MEDICATIONS       Previous Medications    CPAP MACHINE MISC    by Does not apply route    TESTOSTERONE (ANDROGEL) 25 MG/2.5GM (1%) GEL 1 % GEL    Apply 5 g topically daily for 30 days. ALLERGIES     Patient has no known allergies. FAMILY HISTORY     No family history on file.      SOCIAL HISTORY     Social History     Socioeconomic History    Marital status: Single   Tobacco Use    Smoking status: Never    Smokeless tobacco: Never   Vaping Use    Vaping Use: Never used   Substance and Sexual Activity    Alcohol use: Not Currently    Drug use: Never     Social Determinants of Health     Financial Resource Strain: Low Risk     Difficulty of Paying Living Expenses: Not hard at all   Food Insecurity: No Food Insecurity    Worried About Running Out of Food in the Last Year: Never true    Ran Out of Food in the Last Year: Never true       REVIEW OF SYSTEMS       Review of Systems   Constitutional:  Negative for activity change, chills, diaphoresis, fatigue and fever. Respiratory:  Negative for cough, chest tightness, shortness of breath and wheezing. Cardiovascular:  Positive for chest pain. Negative for palpitations and leg swelling. Gastrointestinal:  Negative for abdominal pain, diarrhea, nausea and vomiting. Genitourinary:  Negative for dysuria, flank pain and frequency. Musculoskeletal:  Negative for back pain, myalgias and neck pain. Skin:  Negative for pallor, rash and wound. Neurological:  Negative for dizziness, syncope, light-headedness and headaches. All other systems reviewed and are negative. Except as noted above the remainder of the review of systems was reviewed and is negative. PHYSICAL EXAM    (up to 7 for level 4, 8 or more for level 5)     ED Triage Vitals   BP Temp Temp Source Heart Rate Resp SpO2 Height Weight   09/22/22 2013 09/22/22 2013 09/22/22 2013 09/22/22 2013 09/22/22 2013 09/22/22 2013 09/22/22 2012 09/22/22 2012   132/79 97.9 °F (36.6 °C) Oral 86 18 98 % 5' 11\" (1.803 m) (!) 337 lb (152.9 kg)       Physical Exam  Vitals and nursing note reviewed. Constitutional:       General: He is not in acute distress. Appearance: He is well-developed. He is obese. He is not ill-appearing, toxic-appearing or diaphoretic. HENT:      Head: Normocephalic and atraumatic. Eyes:      General:         Right eye: No discharge. Left eye: No discharge. Conjunctiva/sclera: Conjunctivae normal.      Pupils: Pupils are equal, round, and reactive to light. Neck:      Trachea: Trachea normal.   Cardiovascular:      Rate and Rhythm: Normal rate and regular rhythm. Pulses: Normal pulses. Heart sounds: Normal heart sounds.    Pulmonary:      Effort: Pulmonary effort is normal. No respiratory distress. Breath sounds: Normal breath sounds. No stridor. No wheezing, rhonchi or rales. Chest:      Chest wall: No tenderness. Abdominal:      General: Bowel sounds are normal. There is no distension. Palpations: Abdomen is soft. There is no mass. Tenderness: There is no abdominal tenderness. There is no guarding or rebound. Musculoskeletal:         General: No tenderness or deformity. Normal range of motion. Cervical back: Normal range of motion and neck supple. No rigidity. No spinous process tenderness or muscular tenderness. Right lower leg: No edema. Left lower leg: No edema. Skin:     General: Skin is warm and dry. Capillary Refill: Capillary refill takes less than 2 seconds. Coloration: Skin is not pale. Findings: No erythema or rash. Neurological:      Mental Status: He is alert and oriented to person, place, and time. GCS: GCS eye subscore is 4. GCS verbal subscore is 5. GCS motor subscore is 6. Cranial Nerves: No cranial nerve deficit. Sensory: No sensory deficit. Coordination: Coordination normal.      Gait: Gait normal.   Psychiatric:         Behavior: Behavior normal.       DIAGNOSTIC RESULTS     EKG: (none if blank)  All EKG's areinterpreted by the Emergency Department Physician who either signs or Co-signs this chart in the absence of a cardiologist.    Normal sinus rhythm. Rate 85. QTC of 454. No acute ischemic changes    RADIOLOGY: (none if blank)   Interpretationper the Radiologist below, if available at the time of this note:    XR CHEST (2 VW)   Final Result      No acute findings.                       LABS:  Labs Reviewed   CBC WITH AUTO DIFFERENTIAL - Abnormal; Notable for the following components:       Result Value    WBC 14.4 (*)     Segs Absolute 10.60 (*)     All other components within normal limits   BASIC METABOLIC PANEL - Abnormal; Notable for the following components: Creatinine 0.67 (*)     Bun/Cre Ratio 27 (*)     All other components within normal limits   TROPONIN       All other labs were within normal range or not returned as of this dictation. EMERGENCY DEPARTMENT COURSE and Medical Decision Making:     MDM  /   Evaluated for chest pain. PERC negative. Unlikely PE. No hypoxia or tachycardia. EKG unremarkable. Lab work reviewed. She does have a leukocytosis. He is afebrile. The vital signs are stable. He seems to always have an elevated white blood cell count on chart review. Always appears to be greater than 12. It is nonspecific especially without other findings. He mentions chest pain for the past 2 days but appears it has been longer based on chart review. Chest x-ray was negative for any acute findings. This is unlikely ACS. Able for outpatient follow-up. Given signs and symptoms of when to return to the emergency department  Strict return precautions and follow up instructions were discussed with the patient with which the patient agrees    ED Medications administered this visit:    Medications   ketorolac (TORADOL) injection 15 mg (15 mg IntraMUSCular Not Given 9/22/22 2114)       CONSULTS: (None if blank)  None    Procedures: (None if blank)       CLINICAL IMPRESSION      1. Chest pain, unspecified type    2.  Leukocytosis, unspecified type          DISPOSITION/PLAN    DISPOSITION Decision To Discharge 09/22/2022 09:17:37 PM      PATIENT REFERRED TO:  MELODY Johnson - [facility]  [address] [address] [phone]    Schedule an appointment as soon as possible for a visit in 5 days  If symptoms worsen return to ER    DISCHARGE MEDICATIONS:  New Prescriptions    No medications on file              (Please note that portions of this note were completed with a voicerecognition program.  Efforts were made to edit the dictations but occasionally words are mis-transcribed.)      Lucero Richardson, DO (electronically signed)  Attending Emergency Department Provider        Matteo Hernandez DO  09/22/22 3138

## 2022-09-25 ENCOUNTER — HOSPITAL ENCOUNTER (OUTPATIENT)
Age: 32
Discharge: HOME | End: 2022-09-25
Payer: COMMERCIAL

## 2022-09-25 DIAGNOSIS — R79.89 LOW TESTOSTERONE IN MALE: ICD-10-CM

## 2022-09-25 DIAGNOSIS — R23.2 HOT FLASH IN MALE: ICD-10-CM

## 2022-09-25 LAB — TESTOSTERONE TOTAL: 172 NG/DL (ref 220–1000)

## 2022-09-25 PROCEDURE — 36415 COLL VENOUS BLD VENIPUNCTURE: CPT

## 2022-09-25 PROCEDURE — 84403 ASSAY OF TOTAL TESTOSTERONE: CPT

## 2022-09-26 ENCOUNTER — TELEPHONE (OUTPATIENT)
Dept: FAMILY MEDICINE CLINIC | Age: 32
End: 2022-09-26

## 2022-09-26 DIAGNOSIS — R79.89 LOW TESTOSTERONE IN MALE: ICD-10-CM

## 2022-09-26 RX ORDER — TESTOSTERONE 12.5 MG/1.25G
5 GEL TOPICAL DAILY
Qty: 60 EACH | Refills: 0 | Status: SHIPPED | OUTPATIENT
Start: 2022-09-26 | End: 2022-10-26

## 2022-09-26 NOTE — TELEPHONE ENCOUNTER
----- Message from MELODY Corbett CNP sent at 9/26/2022 11:43 AM EDT -----  Please let patient know that his testosterone level is again low at 172. This is lower than it was 2 weeks ago. Normal ranges 220-1000. We will write for the AndroGel again and see if his insurance will pay for it.

## 2022-09-29 ENCOUNTER — OFFICE VISIT (OUTPATIENT)
Dept: FAMILY MEDICINE CLINIC | Age: 32
End: 2022-09-29
Payer: COMMERCIAL

## 2022-09-29 VITALS
TEMPERATURE: 98.1 F | HEART RATE: 84 BPM | SYSTOLIC BLOOD PRESSURE: 114 MMHG | OXYGEN SATURATION: 97 % | DIASTOLIC BLOOD PRESSURE: 62 MMHG

## 2022-09-29 DIAGNOSIS — R42 DIZZINESS: ICD-10-CM

## 2022-09-29 DIAGNOSIS — R79.89 LOW TESTOSTERONE IN MALE: Primary | ICD-10-CM

## 2022-09-29 PROCEDURE — 99213 OFFICE O/P EST LOW 20 MIN: CPT | Performed by: NURSE PRACTITIONER

## 2022-09-29 RX ORDER — MECLIZINE HCL 12.5 MG/1
12.5 TABLET ORAL 3 TIMES DAILY PRN
Qty: 30 TABLET | Refills: 0 | Status: SHIPPED | OUTPATIENT
Start: 2022-09-29 | End: 2022-10-09

## 2022-09-29 ASSESSMENT — ENCOUNTER SYMPTOMS
COUGH: 0
SHORTNESS OF BREATH: 0
DIARRHEA: 0
VOMITING: 0
NAUSEA: 0

## 2022-09-29 NOTE — PATIENT INSTRUCTIONS
SURVEY:    You may be receiving a survey from [employer] regarding your visit today. Please complete the survey to enable us to provide the highest quality of care to you and your family. If you cannot score us a very good (5 Stars) on any question, please call the office to discuss how we could have made your experience a very good one. Thank you.     Clinical Care Team: MELODY Pollard-YASSINE Agustin LPN    Clerical Team: Madonna Aragon

## 2022-09-29 NOTE — PROGRESS NOTES
HPI Notes    Name: Geri Duncan  : 1990         Chief Complaint:     Chief Complaint   Patient presents with    Headache     Patient complains of headaches that comes and goes. More constant now. History of Present Illness:        HPI  Pt is a 29 yo male who presents for headaches. Headaches that come and go, located on the back and radiate down the neck. Has been getting dizzy spells also. Has noticed when he bends over he gets dizzy. Feels weak at times. Past Medical History:     Past Medical History:   Diagnosis Date    Back pain       Reviewed all health maintenance requirements and ordered appropriate tests  Health Maintenance Due   Topic Date Due    COVID-19 Vaccine (1) Never done    Varicella vaccine (1 of 2 - 2-dose childhood series) Never done    HIV screen  Never done    Hepatitis C screen  Never done    DTaP/Tdap/Td vaccine (1 - Tdap) Never done    Flu vaccine (1) Never done       Past Surgical History:     Past Surgical History:   Procedure Laterality Date    CLEFT PALATE REPAIR          Medications:       Prior to Admission medications    Medication Sig Start Date End Date Taking? Authorizing Provider   meclizine (ANTIVERT) 12.5 MG tablet Take 1 tablet by mouth 3 times daily as needed for Dizziness 9/29/22 10/9/22 Yes MELODY Pablo CNP   CPAP Machine MISC by Does not apply route   Yes Historical Provider, MD   testosterone (ANDROGEL) 25 MG/2.5GM (1%) GEL 1 % gel Apply 5 g topically daily for 30 days. Patient not taking: Reported on 2022 9/26/22 10/26/22  MELODY Pablo CNP        Allergies:       Patient has no known allergies. Social History:     Tobacco:    reports that he has never smoked. He has never used smokeless tobacco.  Alcohol:      reports that he does not currently use alcohol. Drug Use:  reports no history of drug use. Family History:     No family history on file.     Review of Systems:         Review of Systems   Constitutional: Positive for fatigue. Negative for chills and fever. Respiratory:  Negative for cough and shortness of breath. Cardiovascular:  Negative for chest pain and palpitations. Gastrointestinal:  Negative for diarrhea, nausea and vomiting. Neurological:  Positive for dizziness. Negative for seizures, syncope and headaches. Physical Exam:     Vitals:  /62   Pulse 84   Temp 98.1 °F (36.7 °C) (Oral)   SpO2 97%       Physical Exam  Vitals and nursing note reviewed. Constitutional:       Appearance: He is well-developed. HENT:      Head: Normocephalic. Cardiovascular:      Rate and Rhythm: Normal rate and regular rhythm. Heart sounds: S1 normal and S2 normal.   Pulmonary:      Effort: Pulmonary effort is normal. No respiratory distress. Breath sounds: Normal breath sounds. Abdominal:      General: Bowel sounds are normal.      Palpations: Abdomen is soft. Tenderness: There is no abdominal tenderness. Skin:     General: Skin is warm and dry. Neurological:      Mental Status: He is alert and oriented to person, place, and time. GCS: GCS eye subscore is 4. GCS verbal subscore is 5. GCS motor subscore is 6. Psychiatric:         Behavior: Behavior is cooperative.              Data:     Lab Results   Component Value Date/Time     09/22/2022 08:39 PM    K 3.9 09/22/2022 08:39 PM     09/22/2022 08:39 PM    CO2 24 09/22/2022 08:39 PM    BUN 18 09/22/2022 08:39 PM    CREATININE 0.67 09/22/2022 08:39 PM    GLUCOSE 92 09/22/2022 08:39 PM    PROT 7.2 08/19/2022 04:49 PM    LABALBU 4.8 08/19/2022 04:49 PM    BILITOT 0.38 08/19/2022 04:49 PM    ALKPHOS 97 08/19/2022 04:49 PM    AST 16 08/19/2022 04:49 PM    ALT 20 08/19/2022 04:49 PM     Lab Results   Component Value Date/Time    WBC 14.4 09/22/2022 08:39 PM    RBC 5.19 09/22/2022 08:39 PM    HGB 14.0 09/22/2022 08:39 PM    HCT 41.8 09/22/2022 08:39 PM    MCV 80.7 09/22/2022 08:39 PM    MCH 27.0 09/22/2022 08:39 PM    MCHC 33.5 09/22/2022 08:39 PM    RDW 14.8 09/22/2022 08:39 PM     09/22/2022 08:39 PM     No results found for: TSH  Lab Results   Component Value Date/Time    LABA1C 5.6 06/06/2022 03:34 PM          Assessment & Plan        Diagnosis Orders   1. Low testosterone in male  External Referral To Endocrinology      2. Dizziness  meclizine (ANTIVERT) 12.5 MG tablet        Pt strongly encouraged to get testosterone gel and start using it regularly. Will refer to endocrinology. Will give antivert for dizziness. Patient verbalizes understanding and agreement with plan. All questions answered. If symptoms do not resolve or worsen, return to office. Completed Refills   Requested Prescriptions     Signed Prescriptions Disp Refills    meclizine (ANTIVERT) 12.5 MG tablet 30 tablet 0     Sig: Take 1 tablet by mouth 3 times daily as needed for Dizziness     No follow-ups on file. Orders Placed This Encounter   Medications    meclizine (ANTIVERT) 12.5 MG tablet     Sig: Take 1 tablet by mouth 3 times daily as needed for Dizziness     Dispense:  30 tablet     Refill:  0     Orders Placed This Encounter   Procedures    External Referral To Endocrinology     Referral Priority:   Routine     Referral Type:   Eval and Treat     Referral Reason:   Specialty Services Required     Referred to Provider:   Louis Jeffery     Requested Specialty:   Endocrinology     Number of Visits Requested:   1         Patient Instructions   SURVEY:    You may be receiving a survey from [employer] regarding your visit today. Please complete the survey to enable us to provide the highest quality of care to you and your family. If you cannot score us a very good (5 Stars) on any question, please call the office to discuss how we could have made your experience a very good one. Thank you.     Clinical Care Team: MELODY Orozco-YASSINE Macias LPN    Clerical Team: Terrell Pearce [address]   Electronically signed by MELODY Abraham CNP on 9/29/2022 at 4:43 PM           Completed Refills   Requested Prescriptions     Signed Prescriptions Disp Refills    meclizine (ANTIVERT) 12.5 MG tablet 30 tablet 0     Sig: Take 1 tablet by mouth 3 times daily as needed for Dizziness

## 2022-10-13 ENCOUNTER — OFFICE VISIT (OUTPATIENT)
Dept: FAMILY MEDICINE CLINIC | Age: 32
End: 2022-10-13
Payer: COMMERCIAL

## 2022-10-13 VITALS
DIASTOLIC BLOOD PRESSURE: 62 MMHG | OXYGEN SATURATION: 96 % | HEART RATE: 88 BPM | BODY MASS INDEX: 48.26 KG/M2 | SYSTOLIC BLOOD PRESSURE: 130 MMHG | WEIGHT: 315 LBS | TEMPERATURE: 97.4 F

## 2022-10-13 DIAGNOSIS — H65.93 FLUID LEVEL BEHIND TYMPANIC MEMBRANE OF BOTH EARS: ICD-10-CM

## 2022-10-13 DIAGNOSIS — R79.89 LOW TESTOSTERONE IN MALE: ICD-10-CM

## 2022-10-13 DIAGNOSIS — G44.029 CHRONIC CLUSTER HEADACHE, NOT INTRACTABLE: Primary | ICD-10-CM

## 2022-10-13 PROCEDURE — 99213 OFFICE O/P EST LOW 20 MIN: CPT | Performed by: NURSE PRACTITIONER

## 2022-10-13 RX ORDER — TOPIRAMATE 50 MG/1
50 TABLET, FILM COATED ORAL NIGHTLY
Qty: 30 TABLET | Refills: 0 | Status: SHIPPED | OUTPATIENT
Start: 2022-10-13

## 2022-10-13 NOTE — PROGRESS NOTES
HPI Notes    Name: Cordell Sánchez  : 1990         Chief Complaint:     Chief Complaint   Patient presents with    Headache     Patient here today complains of headaches getting worse. History of Present Illness:        Headache  Pt is a 27 yo male who presents for headaches. Started about 1 month ago. Has been having headaches nearly every day. Feels a constant pressure on the top and sides of his head. Affects his vision at times. Gets momentary blurry vision when he turns his head, has to \"blink it away\". Past Medical History:     Past Medical History:   Diagnosis Date    Back pain       Reviewed all health maintenance requirements and ordered appropriate tests  Health Maintenance Due   Topic Date Due    COVID-19 Vaccine (1) Never done    Varicella vaccine (1 of 2 - 2-dose childhood series) Never done    HIV screen  Never done    Hepatitis C screen  Never done    DTaP/Tdap/Td vaccine (1 - Tdap) Never done    Flu vaccine (1) Never done       Past Surgical History:     Past Surgical History:   Procedure Laterality Date    CLEFT PALATE REPAIR          Medications:       Prior to Admission medications    Medication Sig Start Date End Date Taking? Authorizing Provider   testosterone (ANDROGEL) 25 MG/2.5GM (1%) GEL 1 % gel Apply 5 g topically daily for 30 days. 9/26/22 10/26/22 Yes MELODY Alcala CNP   CPAP Machine MISC by Does not apply route   Yes Historical Provider, MD   topiramate (TOPAMAX) 50 MG tablet Take 1 tablet by mouth nightly 10/13/22  Yes MELODY Alcala CNP        Allergies:       Patient has no known allergies. Social History:     Tobacco:    reports that he has never smoked. He has never used smokeless tobacco.  Alcohol:      reports that he does not currently use alcohol. Drug Use:  reports no history of drug use. Family History:     No family history on file. Review of Systems:         Review of Systems   Neurological:  Positive for headaches. Physical Exam:     Vitals:  /62   Pulse 88   Temp 97.4 °F (36.3 °C) (Oral)   Wt (!) 346 lb (156.9 kg)   SpO2 96%   BMI 48.26 kg/m²       Physical Exam  Vitals and nursing note reviewed. Constitutional:       Appearance: He is well-developed. HENT:      Right Ear: A middle ear effusion is present. Left Ear: A middle ear effusion is present. Cardiovascular:      Rate and Rhythm: Normal rate and regular rhythm. Heart sounds: S1 normal and S2 normal.   Pulmonary:      Effort: Pulmonary effort is normal. No respiratory distress. Breath sounds: Normal breath sounds. Abdominal:      General: Bowel sounds are normal.      Palpations: Abdomen is soft. Tenderness: There is no abdominal tenderness. Skin:     General: Skin is warm and dry. Psychiatric:         Behavior: Behavior is cooperative. Data:     Lab Results   Component Value Date/Time     09/22/2022 08:39 PM    K 3.9 09/22/2022 08:39 PM     09/22/2022 08:39 PM    CO2 24 09/22/2022 08:39 PM    BUN 18 09/22/2022 08:39 PM    CREATININE 0.67 09/22/2022 08:39 PM    GLUCOSE 92 09/22/2022 08:39 PM    PROT 7.2 08/19/2022 04:49 PM    LABALBU 4.8 08/19/2022 04:49 PM    BILITOT 0.38 08/19/2022 04:49 PM    ALKPHOS 97 08/19/2022 04:49 PM    AST 16 08/19/2022 04:49 PM    ALT 20 08/19/2022 04:49 PM     Lab Results   Component Value Date/Time    WBC 14.4 09/22/2022 08:39 PM    RBC 5.19 09/22/2022 08:39 PM    HGB 14.0 09/22/2022 08:39 PM    HCT 41.8 09/22/2022 08:39 PM    MCV 80.7 09/22/2022 08:39 PM    MCH 27.0 09/22/2022 08:39 PM    MCHC 33.5 09/22/2022 08:39 PM    RDW 14.8 09/22/2022 08:39 PM     09/22/2022 08:39 PM     No results found for: TSH  Lab Results   Component Value Date/Time    LABA1C 5.6 06/06/2022 03:34 PM          Assessment & Plan        Diagnosis Orders   1. Chronic cluster headache, not intractable   --will start on topamax. Pt educated about medication.         2. Low testosterone in male   --continue taking androgel. Will retest testosterone in 6 weeks. Testosterone      3. Fluid level behind tympanic membrane of both ears   --start zyrtec 10mg daily         Patient verbalizes understanding and agreement with plan. All questions answered. If symptoms do not resolve or worsen, return to office. Completed Refills   Requested Prescriptions     Signed Prescriptions Disp Refills    topiramate (TOPAMAX) 50 MG tablet 30 tablet 0     Sig: Take 1 tablet by mouth nightly     No follow-ups on file. Orders Placed This Encounter   Medications    topiramate (TOPAMAX) 50 MG tablet     Sig: Take 1 tablet by mouth nightly     Dispense:  30 tablet     Refill:  0     Orders Placed This Encounter   Procedures    Testosterone     Standing Status:   Future     Standing Expiration Date:   10/13/2023         Patient Instructions   . SURVEY:    You may be receiving a survey from [employer] regarding your visit today. Please complete the survey to enable us to provide the highest quality of care to you and your family. If you cannot score us a very good (5 Stars) on any question, please call the office to discuss how we could have made your experience a very good one. Thank you.     Clinical Care Team: VALORIE Thurston LPN    Clerical Team: Aisha Driscoll   Electronically signed by MELODY Thurston CNP on 10/13/2022 at 4:36 PM           Completed Refills   Requested Prescriptions     Signed Prescriptions Disp Refills    topiramate (TOPAMAX) 50 MG tablet 30 tablet 0     Sig: Take 1 tablet by mouth nightly

## 2022-10-13 NOTE — PATIENT INSTRUCTIONS
Jaycee [employer] SURVEY:    You may be receiving a survey from [employer] regarding your visit today. Please complete the survey to enable us to provide the highest quality of care to you and your family. If you cannot score us a very good (5 Stars) on any question, please call the office to discuss how we could have made your experience a very good one. Thank you.     Clinical Care Team: Maryla Leyden, APRN-YASSINE Gandhi LPN    Clerical Team: Ana Howard

## 2022-11-17 ENCOUNTER — OFFICE VISIT (OUTPATIENT)
Dept: FAMILY MEDICINE CLINIC | Age: 32
End: 2022-11-17
Payer: COMMERCIAL

## 2022-11-17 VITALS
DIASTOLIC BLOOD PRESSURE: 60 MMHG | BODY MASS INDEX: 49.65 KG/M2 | OXYGEN SATURATION: 96 % | SYSTOLIC BLOOD PRESSURE: 104 MMHG | TEMPERATURE: 97.4 F | WEIGHT: 315 LBS | HEART RATE: 88 BPM

## 2022-11-17 DIAGNOSIS — F41.8 ANXIETY ABOUT HEALTH: ICD-10-CM

## 2022-11-17 DIAGNOSIS — G44.029 CHRONIC CLUSTER HEADACHE, NOT INTRACTABLE: ICD-10-CM

## 2022-11-17 DIAGNOSIS — H81.10 BENIGN PAROXYSMAL VERTIGO, UNSPECIFIED LATERALITY: ICD-10-CM

## 2022-11-17 DIAGNOSIS — R79.89 LOW TESTOSTERONE IN MALE: Primary | ICD-10-CM

## 2022-11-17 PROCEDURE — 99214 OFFICE O/P EST MOD 30 MIN: CPT | Performed by: NURSE PRACTITIONER

## 2022-11-17 RX ORDER — TESTOSTERONE 12.5 MG/1.25G
5 GEL TOPICAL DAILY
Qty: 60 EACH | Refills: 2 | Status: SHIPPED | OUTPATIENT
Start: 2022-11-17 | End: 2022-12-17

## 2022-11-17 RX ORDER — MECLIZINE HYDROCHLORIDE 25 MG/1
TABLET ORAL
COMMUNITY
Start: 2022-11-15

## 2022-11-17 ASSESSMENT — ENCOUNTER SYMPTOMS
COUGH: 0
NAUSEA: 0
VOMITING: 0
SHORTNESS OF BREATH: 0
DIARRHEA: 0

## 2022-11-17 NOTE — PROGRESS NOTES
HPI Notes    Name: Mariel Mcintosh  : 1990         Chief Complaint:     Chief Complaint   Patient presents with    Dizziness     Patient here today for vertigo. He was at ED on and given meclizine. He said vertigo is gone now. He said his head feels different, feels heavy. Fatigue     Patient needs refill on testosterone , he said he feels it is helping. Hot flashes are better, fatigue is improving       History of Present Illness:        HPI  Pt is a 32 yo male who presents for ED follow up. Pt woke up and was very dizzy and the room was spinning. Pt went to the ED and was dx with BPPV. Pt was given antivert. Symptoms are resolved at this time. Pt complains of head feeling heavy with pressure in his forehead. Pt states that he \"knows something is wrong\". Pt denies being anxious about his health. States that his anxiety might be \"10% of the problem, I think there is something else going on\". Past Medical History:     Past Medical History:   Diagnosis Date    Back pain       Reviewed all health maintenance requirements and ordered appropriate tests  Health Maintenance Due   Topic Date Due    COVID-19 Vaccine (1) Never done    Varicella vaccine (1 of 2 - 2-dose childhood series) Never done    HIV screen  Never done    Hepatitis C screen  Never done    DTaP/Tdap/Td vaccine (1 - Tdap) Never done    Flu vaccine (1) Never done       Past Surgical History:     Past Surgical History:   Procedure Laterality Date    CLEFT PALATE REPAIR          Medications:       Prior to Admission medications    Medication Sig Start Date End Date Taking? Authorizing Provider   meclizine (ANTIVERT) 25 MG tablet TAKE 1 TABLET BY MOUTH THREE TIMES DAILY AS NEEDED FOR NAUSEA and dizziness 11/15/22  Yes Historical Provider, MD   testosterone (ANDROGEL) 25 MG/2.5GM (1%) GEL 1 % gel Apply 5 g topically daily for 30 days.  22 Yes MELODY Thurston - CNP   CPAP Machine MISC by Does not apply route   Yes Historical Provider, MD        Allergies:       Patient has no known allergies. Social History:     Tobacco:    reports that he has never smoked. He has never used smokeless tobacco.  Alcohol:      reports that he does not currently use alcohol. Drug Use:  reports no history of drug use. Family History:     No family history on file. Review of Systems:         Review of Systems   Constitutional:  Negative for chills and fever. Respiratory:  Negative for cough and shortness of breath. Cardiovascular:  Negative for chest pain and palpitations. Gastrointestinal:  Negative for diarrhea, nausea and vomiting. Neurological:  Positive for headaches. Negative for dizziness and seizures. Physical Exam:     Vitals:  /60 (Site: Right Upper Arm, Position: Sitting, Cuff Size: Large Adult)   Pulse 88   Temp 97.4 °F (36.3 °C) (Axillary)   Wt (!) 356 lb (161.5 kg)   SpO2 96%   BMI 49.65 kg/m²       Physical Exam  Vitals and nursing note reviewed. Constitutional:       Appearance: He is well-developed. HENT:      Right Ear: A middle ear effusion is present. Left Ear: A middle ear effusion is present. Cardiovascular:      Rate and Rhythm: Normal rate and regular rhythm. Heart sounds: S1 normal and S2 normal.   Pulmonary:      Effort: Pulmonary effort is normal. No respiratory distress. Breath sounds: Normal breath sounds. Abdominal:      General: Bowel sounds are normal.      Palpations: Abdomen is soft. Tenderness: There is no abdominal tenderness. Skin:     General: Skin is warm and dry. Neurological:      Comments: [place] Halpike negative Bilat     Psychiatric:         Behavior: Behavior is cooperative.              Data:     Lab Results   Component Value Date/Time     09/22/2022 08:39 PM    K 3.9 09/22/2022 08:39 PM     09/22/2022 08:39 PM    CO2 24 09/22/2022 08:39 PM    BUN 18 09/22/2022 08:39 PM    CREATININE 0.67 09/22/2022 08:39 PM    GLUCOSE 92 09/22/2022 08:39 PM    PROT 7.2 08/19/2022 04:49 PM    LABALBU 4.8 08/19/2022 04:49 PM    BILITOT 0.38 08/19/2022 04:49 PM    ALKPHOS 97 08/19/2022 04:49 PM    AST 16 08/19/2022 04:49 PM    ALT 20 08/19/2022 04:49 PM     Lab Results   Component Value Date/Time    WBC 14.4 09/22/2022 08:39 PM    RBC 5.19 09/22/2022 08:39 PM    HGB 14.0 09/22/2022 08:39 PM    HCT 41.8 09/22/2022 08:39 PM    MCV 80.7 09/22/2022 08:39 PM    MCH 27.0 09/22/2022 08:39 PM    MCHC 33.5 09/22/2022 08:39 PM    RDW 14.8 09/22/2022 08:39 PM     09/22/2022 08:39 PM     No results found for: TSH  Lab Results   Component Value Date/Time    LABA1C 5.6 06/06/2022 03:34 PM          Assessment & Plan        Diagnosis Orders   1. Low testosterone in male   --continue androgel and get testosterone level checked as ordered. testosterone (ANDROGEL) 25 MG/2.5GM (1%) GEL 1 % gel      2. Benign paroxysmal vertigo, unspecified laterality  --[place] Halpike neg bilat. Continue prn use of antivert and home cawthorne exercises. 3. Anxiety about health   --pt was counseled about his anxiety and pt denies that this is the cause. 4. Chronic cluster headache, not intractable   --resume taking topamax. Will send for CT head         Patient verbalizes understanding and agreement with plan. All questions answered. If symptoms do not resolve or worsen, return to office. Completed Refills   Requested Prescriptions     Signed Prescriptions Disp Refills    testosterone (ANDROGEL) 25 MG/2.5GM (1%) GEL 1 % gel 60 each 2     Sig: Apply 5 g topically daily for 30 days. No follow-ups on file. Orders Placed This Encounter   Medications    testosterone (ANDROGEL) 25 MG/2.5GM (1%) GEL 1 % gel     Sig: Apply 5 g topically daily for 30 days. Dispense:  60 each     Refill:  2     No orders of the defined types were placed in this encounter. There are no Patient Instructions on file for this visit.     Electronically signed by Carmella Lora MELODY Jha CNP on 11/17/2022 at 4:39 PM           Completed Refills   Requested Prescriptions     Signed Prescriptions Disp Refills    testosterone (ANDROGEL) 25 MG/2.5GM (1%) GEL 1 % gel 60 each 2     Sig: Apply 5 g topically daily for 30 days.

## 2022-11-20 ENCOUNTER — HOSPITAL ENCOUNTER (OUTPATIENT)
Age: 32
Discharge: HOME | End: 2022-11-20
Payer: COMMERCIAL

## 2022-11-20 DIAGNOSIS — R79.89 LOW TESTOSTERONE IN MALE: ICD-10-CM

## 2022-11-20 LAB — TESTOSTERONE TOTAL: 243 NG/DL (ref 220–1000)

## 2022-11-20 PROCEDURE — 36415 COLL VENOUS BLD VENIPUNCTURE: CPT

## 2022-11-20 PROCEDURE — 84403 ASSAY OF TOTAL TESTOSTERONE: CPT

## 2022-12-02 ENCOUNTER — HOSPITAL ENCOUNTER (OUTPATIENT)
Dept: CT IMAGING | Age: 32
Discharge: HOME | End: 2022-12-02
Payer: COMMERCIAL

## 2022-12-02 DIAGNOSIS — G44.029 CHRONIC CLUSTER HEADACHE, NOT INTRACTABLE: ICD-10-CM

## 2022-12-02 PROCEDURE — 70450 CT HEAD/BRAIN W/O DYE: CPT

## 2023-11-11 NOTE — PATIENT INSTRUCTIONS
Attempted to call the patient's  but was unable to get in touch. SURVEY:    You may be receiving a survey from [employer] regarding your visit today. Please complete the survey to enable us to provide the highest quality of care to you and your family. If you cannot score us a very good (5 Stars) on any question, please call the office to discuss how we could have made your experience a very good one. Thank you.     Clinical Care Team: MELODY Bills-YASSINE Walsh LPN    Clerical Team: Ana Plummer

## 2024-07-17 ENCOUNTER — TELEPHONE (OUTPATIENT)
Dept: FAMILY MEDICINE CLINIC | Age: 34
End: 2024-07-17

## 2024-07-17 NOTE — TELEPHONE ENCOUNTER
Called patient to schedule annual physical, patient states he see's a different provider now for his primary care.